# Patient Record
Sex: MALE | Race: OTHER | HISPANIC OR LATINO
[De-identification: names, ages, dates, MRNs, and addresses within clinical notes are randomized per-mention and may not be internally consistent; named-entity substitution may affect disease eponyms.]

---

## 2017-12-02 ENCOUNTER — RX RENEWAL (OUTPATIENT)
Age: 72
End: 2017-12-02

## 2018-03-11 ENCOUNTER — RX RENEWAL (OUTPATIENT)
Age: 73
End: 2018-03-11

## 2018-03-21 ENCOUNTER — APPOINTMENT (OUTPATIENT)
Dept: INTERNAL MEDICINE | Facility: CLINIC | Age: 73
End: 2018-03-21

## 2018-03-21 DIAGNOSIS — L21.0 SEBORRHEA CAPITIS: ICD-10-CM

## 2018-03-23 PROBLEM — L21.0 DANDRUFF: Status: ACTIVE | Noted: 2018-03-23

## 2018-05-07 ENCOUNTER — RX RENEWAL (OUTPATIENT)
Age: 73
End: 2018-05-07

## 2018-07-13 ENCOUNTER — APPOINTMENT (OUTPATIENT)
Dept: INTERNAL MEDICINE | Facility: CLINIC | Age: 73
End: 2018-07-13
Payer: MEDICARE

## 2018-07-13 PROCEDURE — 99214 OFFICE O/P EST MOD 30 MIN: CPT

## 2018-09-20 ENCOUNTER — MEDICATION RENEWAL (OUTPATIENT)
Age: 73
End: 2018-09-20

## 2018-10-08 ENCOUNTER — NON-APPOINTMENT (OUTPATIENT)
Age: 73
End: 2018-10-08

## 2018-10-08 ENCOUNTER — APPOINTMENT (OUTPATIENT)
Dept: INTERNAL MEDICINE | Facility: CLINIC | Age: 73
End: 2018-10-08
Payer: MEDICARE

## 2018-10-08 VITALS
WEIGHT: 174 LBS | HEIGHT: 65 IN | TEMPERATURE: 98.9 F | SYSTOLIC BLOOD PRESSURE: 145 MMHG | DIASTOLIC BLOOD PRESSURE: 80 MMHG | HEART RATE: 60 BPM | BODY MASS INDEX: 28.99 KG/M2

## 2018-10-08 PROCEDURE — 90686 IIV4 VACC NO PRSV 0.5 ML IM: CPT

## 2018-10-08 PROCEDURE — 93000 ELECTROCARDIOGRAM COMPLETE: CPT

## 2018-10-08 PROCEDURE — G0439: CPT

## 2018-10-08 PROCEDURE — G0009: CPT

## 2018-10-08 PROCEDURE — 90732 PPSV23 VACC 2 YRS+ SUBQ/IM: CPT

## 2018-10-08 PROCEDURE — G0008: CPT

## 2018-10-08 NOTE — ASSESSMENT
[FreeTextEntry1] : CPE OF 73 Y OLD MALE WITH PMX  AND DYSLIPIDEMIA= CONTINUE SAME MEDS AND DIET AND EXERCISE\par \par SEBORRHEIC DERMATITIS= CLOBETASOL AND KETOCONAZOLE TOPICAL RX\par INFLUENZA VACCINE AND PNEUMOVAX ORDERED\par RTO  3M \par EKG NEXT VISIT

## 2018-10-08 NOTE — PHYSICAL EXAM

## 2018-10-08 NOTE — REVIEW OF SYSTEMS
[Joint Pain] : joint pain [Back Pain] : back pain [Itching] : itching [Skin Rash] : skin rash [Negative] : Heme/Lymph [FreeTextEntry9] : LEFT HIP PAIN

## 2018-10-08 NOTE — HISTORY OF PRESENT ILLNESS
[de-identified] : PT COMES FOR CPE\par DEVELOPED VIRAL GASTROENTERITIS SX  X1 DAY LAST WEEK AND WORSENING OF SCALP DERMATITIS

## 2019-01-14 ENCOUNTER — APPOINTMENT (OUTPATIENT)
Dept: INTERNAL MEDICINE | Facility: CLINIC | Age: 74
End: 2019-01-14
Payer: MEDICARE

## 2019-01-14 VITALS
HEIGHT: 65 IN | BODY MASS INDEX: 29.16 KG/M2 | RESPIRATION RATE: 12 BRPM | DIASTOLIC BLOOD PRESSURE: 80 MMHG | WEIGHT: 175 LBS | SYSTOLIC BLOOD PRESSURE: 165 MMHG | HEART RATE: 71 BPM | OXYGEN SATURATION: 94 % | TEMPERATURE: 97.9 F

## 2019-01-14 PROCEDURE — 99214 OFFICE O/P EST MOD 30 MIN: CPT

## 2019-01-14 NOTE — PHYSICAL EXAM

## 2019-01-14 NOTE — HISTORY OF PRESENT ILLNESS
[de-identified] : PT COMES FOR F/U DM,HTN AND DYSLIPIDEMIA BUT ALSO WITH CC OF POST URI COUGH FOR OVER 1 WEEK WITH PURULENT SPUTUM LAST 2-3 DAYS ,NO FEVER ,NASAL CONGESTION AND D/C AS WELL

## 2019-01-14 NOTE — ASSESSMENT
[FreeTextEntry1] : 73 Y OLD MALE WITH PMX OF HTN ,DM AND DYSLIPIDEMIA= LABS ORDERED\par POST URI COUGH WITH PURULENT SPUTUM= ? EARLY SINUSITIS AND BRONCHITIS= EMPIRIC AUGMENTIN 875 BID FOR 7 DAYS

## 2019-01-15 LAB
ALBUMIN SERPL ELPH-MCNC: 4.6 G/DL
ALP BLD-CCNC: 41 U/L
ALT SERPL-CCNC: 29 U/L
ANION GAP SERPL CALC-SCNC: 12 MMOL/L
AST SERPL-CCNC: 28 U/L
BILIRUB SERPL-MCNC: 0.3 MG/DL
BUN SERPL-MCNC: 18 MG/DL
CALCIUM SERPL-MCNC: 10.4 MG/DL
CHLORIDE SERPL-SCNC: 102 MMOL/L
CHOLEST SERPL-MCNC: 233 MG/DL
CHOLEST/HDLC SERPL: 5.7 RATIO
CO2 SERPL-SCNC: 30 MMOL/L
CREAT SERPL-MCNC: 1.17 MG/DL
GLUCOSE SERPL-MCNC: 138 MG/DL
HBA1C MFR BLD HPLC: 6.9 %
HDLC SERPL-MCNC: 41 MG/DL
LDLC SERPL CALC-MCNC: 132 MG/DL
POTASSIUM SERPL-SCNC: 4.5 MMOL/L
PROT SERPL-MCNC: 7.2 G/DL
SODIUM SERPL-SCNC: 143 MMOL/L
TRIGL SERPL-MCNC: 302 MG/DL

## 2019-01-22 ENCOUNTER — RX RENEWAL (OUTPATIENT)
Age: 74
End: 2019-01-22

## 2019-04-26 ENCOUNTER — RX RENEWAL (OUTPATIENT)
Age: 74
End: 2019-04-26

## 2019-06-12 ENCOUNTER — APPOINTMENT (OUTPATIENT)
Dept: INTERNAL MEDICINE | Facility: CLINIC | Age: 74
End: 2019-06-12
Payer: MEDICARE

## 2019-06-12 VITALS
WEIGHT: 174 LBS | SYSTOLIC BLOOD PRESSURE: 159 MMHG | RESPIRATION RATE: 14 BRPM | TEMPERATURE: 98.7 F | BODY MASS INDEX: 28.99 KG/M2 | HEIGHT: 65 IN | HEART RATE: 75 BPM | OXYGEN SATURATION: 95 % | DIASTOLIC BLOOD PRESSURE: 76 MMHG

## 2019-06-12 VITALS — SYSTOLIC BLOOD PRESSURE: 150 MMHG | DIASTOLIC BLOOD PRESSURE: 70 MMHG

## 2019-06-12 DIAGNOSIS — M16.10 UNILATERAL PRIMARY OSTEOARTHRITIS, UNSPECIFIED HIP: ICD-10-CM

## 2019-06-12 PROCEDURE — 99214 OFFICE O/P EST MOD 30 MIN: CPT

## 2019-06-12 RX ORDER — AMOXICILLIN AND CLAVULANATE POTASSIUM 875; 125 MG/1; MG/1
875-125 TABLET, COATED ORAL
Qty: 14 | Refills: 0 | Status: DISCONTINUED | COMMUNITY
Start: 2019-01-14 | End: 2019-06-12

## 2019-06-12 NOTE — HISTORY OF PRESENT ILLNESS
[de-identified] : PT COMES FOR F/U DM,HTN AND DYSLIPIDEMIA\par REMAINS SEDENATRY ,WORSENING HIP PAin\par going to [facility] in 1 m for 2 m

## 2019-06-12 NOTE — ASSESSMENT
[FreeTextEntry1] : F/U VISIT OF 74 Y OLD MALE WITH PMX OF DM= STABLE HBA1C 6.6 ON METFORMIN\par DYSLIPIDEMIA= CONTINUE ZOCOR 40 MG DAILY AND FENOFIBRATE\par HTN = STABLE ON LOSARTAN/HCTZ\par RTO IN 3M\par HIP OA= WALKING RECOM

## 2019-06-12 NOTE — PHYSICAL EXAM
[No Acute Distress] : no acute distress [Well Developed] : well developed [Well Nourished] : well nourished [Normal Sclera/Conjunctiva] : normal sclera/conjunctiva [Well-Appearing] : well-appearing [PERRL] : pupils equal round and reactive to light [EOMI] : extraocular movements intact [Normal Outer Ear/Nose] : the outer ears and nose were normal in appearance [Normal Oropharynx] : the oropharynx was normal [No JVD] : no jugular venous distention [Supple] : supple [No Lymphadenopathy] : no lymphadenopathy [No Respiratory Distress] : no respiratory distress  [Thyroid Normal, No Nodules] : the thyroid was normal and there were no nodules present [No Accessory Muscle Use] : no accessory muscle use [Clear to Auscultation] : lungs were clear to auscultation bilaterally [Regular Rhythm] : with a regular rhythm [Normal Rate] : normal rate  [Normal S1, S2] : normal S1 and S2 [No Murmur] : no murmur heard [No Carotid Bruits] : no carotid bruits [No Abdominal Bruit] : a ~M bruit was not heard ~T in the abdomen [No Edema] : there was no peripheral edema [Pedal Pulses Present] : the pedal pulses are present [No Varicosities] : no varicosities [No Extremity Clubbing/Cyanosis] : no extremity clubbing/cyanosis [No Palpable Aorta] : no palpable aorta [Non Tender] : non-tender [Soft] : abdomen soft [No Masses] : no abdominal mass palpated [Non-distended] : non-distended [No HSM] : no HSM [Normal Bowel Sounds] : normal bowel sounds [Normal Posterior Cervical Nodes] : no posterior cervical lymphadenopathy [Normal Anterior Cervical Nodes] : no anterior cervical lymphadenopathy [No CVA Tenderness] : no CVA  tenderness [Grossly Normal Strength/Tone] : grossly normal strength/tone [No Joint Swelling] : no joint swelling [No Spinal Tenderness] : no spinal tenderness [No Rash] : no rash [Normal Gait] : normal gait [Coordination Grossly Intact] : coordination grossly intact [No Focal Deficits] : no focal deficits [Normal Affect] : the affect was normal [Deep Tendon Reflexes (DTR)] : deep tendon reflexes were 2+ and symmetric [de-identified] : OBESE [Normal Insight/Judgement] : insight and judgment were intact

## 2019-11-20 ENCOUNTER — APPOINTMENT (OUTPATIENT)
Dept: INTERNAL MEDICINE | Facility: CLINIC | Age: 74
End: 2019-11-20

## 2019-11-27 ENCOUNTER — APPOINTMENT (OUTPATIENT)
Dept: INTERNAL MEDICINE | Facility: CLINIC | Age: 74
End: 2019-11-27
Payer: MEDICARE

## 2019-11-27 ENCOUNTER — NON-APPOINTMENT (OUTPATIENT)
Age: 74
End: 2019-11-27

## 2019-11-27 VITALS
BODY MASS INDEX: 26.07 KG/M2 | DIASTOLIC BLOOD PRESSURE: 80 MMHG | SYSTOLIC BLOOD PRESSURE: 150 MMHG | OXYGEN SATURATION: 95 % | RESPIRATION RATE: 12 BRPM | HEART RATE: 74 BPM | WEIGHT: 172 LBS | HEIGHT: 68 IN | TEMPERATURE: 97.9 F

## 2019-11-27 LAB
ALBUMIN SERPL ELPH-MCNC: 4.6 G/DL
ALP BLD-CCNC: 33 U/L
ALT SERPL-CCNC: 42 U/L
ANION GAP SERPL CALC-SCNC: 13 MMOL/L
AST SERPL-CCNC: 25 U/L
BILIRUB SERPL-MCNC: 0.5 MG/DL
BUN SERPL-MCNC: 25 MG/DL
CALCIUM SERPL-MCNC: 9.9 MG/DL
CHLORIDE SERPL-SCNC: 103 MMOL/L
CHOLEST SERPL-MCNC: 244 MG/DL
CHOLEST/HDLC SERPL: 5.8 RATIO
CO2 SERPL-SCNC: 28 MMOL/L
CREAT SERPL-MCNC: 1.06 MG/DL
ESTIMATED AVERAGE GLUCOSE: 143 MG/DL
GLUCOSE SERPL-MCNC: 180 MG/DL
HBA1C MFR BLD HPLC: 6.6 %
HDLC SERPL-MCNC: 42 MG/DL
LDLC SERPL CALC-MCNC: 136 MG/DL
POTASSIUM SERPL-SCNC: 5.3 MMOL/L
PROT SERPL-MCNC: 7 G/DL
SODIUM SERPL-SCNC: 144 MMOL/L
TRIGL SERPL-MCNC: 331 MG/DL

## 2019-11-27 PROCEDURE — G0008: CPT

## 2019-11-27 PROCEDURE — G0439: CPT

## 2019-11-27 PROCEDURE — 93000 ELECTROCARDIOGRAM COMPLETE: CPT

## 2019-11-27 PROCEDURE — 90662 IIV NO PRSV INCREASED AG IM: CPT

## 2019-11-27 RX ORDER — METFORMIN HYDROCHLORIDE 500 MG/1
500 TABLET, EXTENDED RELEASE ORAL
Qty: 180 | Refills: 3 | Status: DISCONTINUED | COMMUNITY
Start: 2017-03-13 | End: 2019-11-27

## 2019-11-27 NOTE — ASSESSMENT
[FreeTextEntry1] : CPE OF 74 Y OLD MALE WITH PMX OF HTN = INCREASE LOSARTAN /12.5 FROM 50-12.5\par DM = STABLE \par DYSLIPIDEMIA= LOW FAT LOW ALCOHOL CONSUMPTION \par RTO IN 4 M\par F/U OPHTH,GI AND POD REFERRAL PROVIDED\par INFLUENZA VACCINE TODAY

## 2020-01-10 ENCOUNTER — APPOINTMENT (OUTPATIENT)
Dept: INTERNAL MEDICINE | Facility: CLINIC | Age: 75
End: 2020-01-10
Payer: MEDICARE

## 2020-01-10 VITALS
TEMPERATURE: 98.9 F | BODY MASS INDEX: 28.66 KG/M2 | DIASTOLIC BLOOD PRESSURE: 71 MMHG | HEIGHT: 65 IN | SYSTOLIC BLOOD PRESSURE: 141 MMHG | HEART RATE: 65 BPM | RESPIRATION RATE: 14 BRPM | WEIGHT: 172 LBS | OXYGEN SATURATION: 96 %

## 2020-01-10 PROCEDURE — 99214 OFFICE O/P EST MOD 30 MIN: CPT

## 2020-01-10 NOTE — ASSESSMENT
[FreeTextEntry1] : 74 Y OLD MALE WITH ATYP CHEST PAIN= CXR ORDERED AND LEFT RIB CAGE XR\par LIN AND EASY FATIGUE= TO SEE CARDIOLOGY

## 2020-01-10 NOTE — PHYSICAL EXAM
[Well Nourished] : well nourished [Well Developed] : well developed [No Acute Distress] : no acute distress [Well-Appearing] : well-appearing [Normal Sclera/Conjunctiva] : normal sclera/conjunctiva [EOMI] : extraocular movements intact [PERRL] : pupils equal round and reactive to light [Normal Oropharynx] : the oropharynx was normal [Normal Outer Ear/Nose] : the outer ears and nose were normal in appearance [No JVD] : no jugular venous distention [Supple] : supple [No Lymphadenopathy] : no lymphadenopathy [No Respiratory Distress] : no respiratory distress  [No Accessory Muscle Use] : no accessory muscle use [Thyroid Normal, No Nodules] : the thyroid was normal and there were no nodules present [Normal Rate] : normal rate  [Regular Rhythm] : with a regular rhythm [Clear to Auscultation] : lungs were clear to auscultation bilaterally [Normal S1, S2] : normal S1 and S2 [No Carotid Bruits] : no carotid bruits [No Murmur] : no murmur heard [No Abdominal Bruit] : a ~M bruit was not heard ~T in the abdomen [No Varicosities] : no varicosities [Pedal Pulses Present] : the pedal pulses are present [No Palpable Aorta] : no palpable aorta [No Edema] : there was no peripheral edema [No Extremity Clubbing/Cyanosis] : no extremity clubbing/cyanosis [Soft] : abdomen soft [Non Tender] : non-tender [Normal Bowel Sounds] : normal bowel sounds [Non-distended] : non-distended [No Masses] : no abdominal mass palpated [No HSM] : no HSM [No CVA Tenderness] : no CVA  tenderness [Normal Posterior Cervical Nodes] : no posterior cervical lymphadenopathy [Normal Anterior Cervical Nodes] : no anterior cervical lymphadenopathy [No Rash] : no rash [No Spinal Tenderness] : no spinal tenderness [No Joint Swelling] : no joint swelling [Grossly Normal Strength/Tone] : grossly normal strength/tone [Normal Gait] : normal gait [No Focal Deficits] : no focal deficits [Coordination Grossly Intact] : coordination grossly intact [Deep Tendon Reflexes (DTR)] : deep tendon reflexes were 2+ and symmetric [Normal Affect] : the affect was normal [Normal Insight/Judgement] : insight and judgment were intact [de-identified] : MINIMAL TEDNERNESS LEFT RIB CAGE MEDIAL AXILLARY REGION 5TH RIB

## 2020-01-10 NOTE — HISTORY OF PRESENT ILLNESS
[FreeTextEntry8] : CC OF 2 M OF VAGUE MILD LEFT RIB CGE PAIN ,AXILLARY REGION AFTER SUDDEN INTENSE PAIN IN SAME LOCATION FOR FEW SECONDS THEN\par NO OTHER ASSOCIATED SX\par WAS ON VACATION FEW WEEKS AGO AND NOTICED THAT HE DEVELOPED SOB FASTER OR SOONER THAT HE USED TO DEVELOPED 1 Y AGO

## 2020-01-15 ENCOUNTER — APPOINTMENT (OUTPATIENT)
Dept: INTERNAL MEDICINE | Facility: CLINIC | Age: 75
End: 2020-01-15
Payer: MEDICARE

## 2020-01-15 VITALS
HEIGHT: 65 IN | DIASTOLIC BLOOD PRESSURE: 76 MMHG | WEIGHT: 169 LBS | OXYGEN SATURATION: 96 % | SYSTOLIC BLOOD PRESSURE: 166 MMHG | HEART RATE: 68 BPM | BODY MASS INDEX: 28.16 KG/M2 | TEMPERATURE: 98.8 F | RESPIRATION RATE: 12 BRPM

## 2020-01-15 DIAGNOSIS — R06.2 WHEEZING: ICD-10-CM

## 2020-01-15 DIAGNOSIS — R06.09 OTHER FORMS OF DYSPNEA: ICD-10-CM

## 2020-01-15 DIAGNOSIS — J06.9 ACUTE UPPER RESPIRATORY INFECTION, UNSPECIFIED: ICD-10-CM

## 2020-01-15 PROCEDURE — 99214 OFFICE O/P EST MOD 30 MIN: CPT

## 2020-01-15 NOTE — REVIEW OF SYSTEMS
[Fever] : fever [Chills] : chills [Fatigue] : fatigue [Night Sweats] : night sweats [Sore Throat] : sore throat [Cough] : cough [Wheezing] : wheezing [Shortness Of Breath] : shortness of breath [Dyspnea on Exertion] : dyspnea on exertion [Negative] : Heme/Lymph [FreeTextEntry5] : SEE HPI

## 2020-01-15 NOTE — PHYSICAL EXAM
[Normal Rhythm/Effort] : normal respiratory rhythm and effort [Dry Cough] : a dry cough was heard [Scattered Wheezes] : scattered wheezing was heard [Decreased Breath Sounds] : breath sounds were decreased diffusely [Normal to Percussion] : the lungs were normal to percussion [Normal Rate] : normal rate  [Normal] : palpation of the chest was normal [No Edema] : there was no peripheral edema [Normal S1, S2] : normal S1 and S2 [Regular Rhythm] : with a regular rhythm

## 2020-01-15 NOTE — HISTORY OF PRESENT ILLNESS
[FreeTextEntry8] : PT COMES WITH CC OF COUGH ,SOB ,MILD WHEEZING CHILLS AND FEVER FOR LAST 4 DAYS ,LAST 24 HS FEVER AND BODY ACHES RESOLVED AND COUGH IS HANK INTENSE\par ALSO AS PER WIFE LIN HAS BEEN HAPPENING FOR LAST FEW MONTHS

## 2020-01-15 NOTE — ASSESSMENT
[FreeTextEntry1] : 74 Y OLD MALE PRESENTS WITH ACUTE VIRAL URI WITH SOME WHEEZING = TESSALON PEARLS TID AND PROVENTIL INH RX\par LIN FOR MONTHS ,FORMER SMOKER 30 Y AGO ,? COPD ON CXR = REFER TO SEE CARDIOLOGY \par RTO  PRN

## 2020-01-21 ENCOUNTER — APPOINTMENT (OUTPATIENT)
Dept: HEART AND VASCULAR | Facility: CLINIC | Age: 75
End: 2020-01-21
Payer: MEDICARE

## 2020-01-21 ENCOUNTER — NON-APPOINTMENT (OUTPATIENT)
Age: 75
End: 2020-01-21

## 2020-01-21 VITALS
DIASTOLIC BLOOD PRESSURE: 76 MMHG | BODY MASS INDEX: 27.82 KG/M2 | SYSTOLIC BLOOD PRESSURE: 146 MMHG | WEIGHT: 167 LBS | OXYGEN SATURATION: 95 % | HEIGHT: 65 IN | RESPIRATION RATE: 14 BRPM | TEMPERATURE: 98.1 F | HEART RATE: 67 BPM

## 2020-01-21 PROCEDURE — 99205 OFFICE O/P NEW HI 60 MIN: CPT

## 2020-01-21 NOTE — PHYSICAL EXAM
[General Appearance - Well Developed] : well developed [Normal Appearance] : normal appearance [Well Groomed] : well groomed [General Appearance - Well Nourished] : well nourished [General Appearance - In No Acute Distress] : no acute distress [No Deformities] : no deformities [Heart Sounds] : normal S1 and S2 [Heart Rate And Rhythm] : heart rate and rhythm were normal [Murmurs] : no murmurs present [Respiration, Rhythm And Depth] : normal respiratory rhythm and effort [Exaggerated Use Of Accessory Muscles For Inspiration] : no accessory muscle use [Auscultation Breath Sounds / Voice Sounds] : lungs were clear to auscultation bilaterally [Abdomen Soft] : soft [Abdomen Tenderness] : non-tender [Abdomen Mass (___ Cm)] : no abdominal mass palpated [Abnormal Walk] : normal gait [Gait - Sufficient For Exercise Testing] : the gait was sufficient for exercise testing [Cyanosis, Localized] : no localized cyanosis [Nail Clubbing] : no clubbing of the fingernails [Petechial Hemorrhages (___cm)] : no petechial hemorrhages [] : no ischemic changes

## 2020-01-23 ENCOUNTER — APPOINTMENT (OUTPATIENT)
Dept: HEART AND VASCULAR | Facility: CLINIC | Age: 75
End: 2020-01-23
Payer: MEDICARE

## 2020-01-23 PROCEDURE — 93015 CV STRESS TEST SUPVJ I&R: CPT

## 2020-01-23 PROCEDURE — 93306 TTE W/DOPPLER COMPLETE: CPT

## 2020-01-23 NOTE — ASSESSMENT
[FreeTextEntry1] : 75 yo M with DM, HLD, here for first evaluation of lin and chest pain. \par \par CHEST PAIN\par -atypical but in the setting of risk factors will order a stress test to r/o CAD\par -echo \par -cont with BP control \par \par LIN\par -echo as above to r/o any WMA and valvular pathology \par \par HTN\par -uncontrolled\par -increase losartan-hctz to losartan-hctz 100-25 mg daily \par -cont with metoprolol 100 mg daily \par -CMP\par -echo\par \par HLD\par -fasting lipid panel\par \par f/u in 3 weeks for echo, stress test, labs and BP check

## 2020-01-23 NOTE — HISTORY OF PRESENT ILLNESS
[FreeTextEntry1] : 73 yo M with DM, HLD, here for first evaluation of lin and chest pain. \par Has not seen a cardiologist in 15 years. \par Pain is continuous, on left rib cage and sub axillary The patient is reporting a 3/10 continuos not better defined chest pain for 6 weeks radiating to neck and left arm. \par Unlimited exercise tolerance (can walk up to 20 minutes) limited by  LIN \par \par PMH \par as above\par \par ALL\par NKDA\par \par MEDS\par metorpolol 100 mg daily \par losartan 1100-12.5 mg daily \par simvsatin 40 mg daily \par \par FH\par  at age 57 of [place]\par \par SH\par no smoke, 1 bottle of wine/day, occasional wiky, no drugs

## 2020-01-24 LAB
ALBUMIN SERPL ELPH-MCNC: 5.2 G/DL
ALP BLD-CCNC: 38 U/L
ALT SERPL-CCNC: 40 U/L
ANION GAP SERPL CALC-SCNC: 27 MMOL/L
AST SERPL-CCNC: 35 U/L
BILIRUB SERPL-MCNC: 0.4 MG/DL
BUN SERPL-MCNC: 21 MG/DL
CALCIUM SERPL-MCNC: 10.5 MG/DL
CHLORIDE SERPL-SCNC: 104 MMOL/L
CHOLEST SERPL-MCNC: 197 MG/DL
CHOLEST/HDLC SERPL: 4.9 RATIO
CO2 SERPL-SCNC: 16 MMOL/L
CREAT SERPL-MCNC: 1.12 MG/DL
GLUCOSE SERPL-MCNC: 144 MG/DL
HDLC SERPL-MCNC: 41 MG/DL
LDLC SERPL CALC-MCNC: 103 MG/DL
POTASSIUM SERPL-SCNC: 5.1 MMOL/L
PROT SERPL-MCNC: 8 G/DL
SODIUM SERPL-SCNC: 146 MMOL/L
TRIGL SERPL-MCNC: 271 MG/DL

## 2020-02-04 ENCOUNTER — APPOINTMENT (OUTPATIENT)
Dept: HEART AND VASCULAR | Facility: CLINIC | Age: 75
End: 2020-02-04
Payer: MEDICARE

## 2020-02-04 VITALS
TEMPERATURE: 98.1 F | BODY MASS INDEX: 27.82 KG/M2 | HEIGHT: 65 IN | HEART RATE: 66 BPM | SYSTOLIC BLOOD PRESSURE: 137 MMHG | RESPIRATION RATE: 14 BRPM | DIASTOLIC BLOOD PRESSURE: 62 MMHG | OXYGEN SATURATION: 96 % | WEIGHT: 167 LBS

## 2020-02-04 PROCEDURE — 99215 OFFICE O/P EST HI 40 MIN: CPT

## 2020-02-04 NOTE — PHYSICAL EXAM
[General Appearance - Well Developed] : well developed [Normal Appearance] : normal appearance [No Deformities] : no deformities [Well Groomed] : well groomed [General Appearance - Well Nourished] : well nourished [General Appearance - In No Acute Distress] : no acute distress [Heart Rate And Rhythm] : heart rate and rhythm were normal [Heart Sounds] : normal S1 and S2 [Murmurs] : no murmurs present [Respiration, Rhythm And Depth] : normal respiratory rhythm and effort [Exaggerated Use Of Accessory Muscles For Inspiration] : no accessory muscle use [Auscultation Breath Sounds / Voice Sounds] : lungs were clear to auscultation bilaterally [Abdomen Soft] : soft [Abdomen Tenderness] : non-tender [Abdomen Mass (___ Cm)] : no abdominal mass palpated [Abnormal Walk] : normal gait [Gait - Sufficient For Exercise Testing] : the gait was sufficient for exercise testing [Nail Clubbing] : no clubbing of the fingernails [Cyanosis, Localized] : no localized cyanosis [Petechial Hemorrhages (___cm)] : no petechial hemorrhages [] : no ischemic changes

## 2020-02-04 NOTE — ASSESSMENT
[FreeTextEntry1] : 73 yo M with DM, HLD, here for followup. \par \par CHEST PAIN\par -Resolved\par -negative exercise stress test \par -echo wnl\par -cont with BP control \par \par LIN \par -resolved \par \par HTN\par -Well-controlled today\par -Patient had increased losartan-hctz as recommended on last visit (still taking 100-12.5) \par -Recommended to continue with current medication regimen and call the clinic if blood pressure is > 140/90\par -cont with metoprolol 100 mg daily \par -repeat CMP in 3 month for monitoring K (5.1 today)\par -echo wnl\par \par HLD\par -fasting lipid panel reviewed \par -Hypertriglyceridemia noted,(downtrending)  the patient wants to continue with healthy diet and exercise prior to start a medications\par -Recommended healthy diet and will repeat lipid panel in 4 months\par \par f/u in 3 months or sooner if any symptoms

## 2020-02-04 NOTE — HISTORY OF PRESENT ILLNESS
[FreeTextEntry1] : 73 yo M with DM, HLD, here for follow up and obtain stress test results \par  The patient reports complete resolution of his chest pain with resolution of his cough (in setting of URI). Today is accompanied by his wife. \par He has a limited exercise tolerance and feels overall fine. \par Denies chest pain, sob, palpitations, syncope, presyncope. \par Reports that he did not change his medications as prescribed on last visit as he reports that his blood pressure has been well controlled at home. \par \par \par PMH \par as above\par \par ALL\par NKDA\par \par MEDS\par metorpolol 100 mg daily \par losartan 100-12.5 mg daily \par simvsatin 40 mg daily \par \par FH\par  at age 57 of [place]\par \par SH\par no smoke, 1 bottle of wine/day, occasional wiky, no drugs\par \par EKG 2020\par SR, wnl\par \par Echocardiogram 2020\par Left ventricular ejection fraction is 60-65%\par Mild MR\par \par Exercise stress test \par The patient exercised for 7 minutes 58 seconds on a Neville protocol (94% of MPHR)\par EKG with 2 mm ST depression upsloping in V5 and V6 at peak exercise\par The study was stopped due to fatigue, No chest pain no arrhythmias during exercise or recovery\par \par LABS\par 2020\par creat 1.1\par K 5.1 \par \par Lipid profile 2020\par Triglycerides 271\par Cholesterol 197\par HDL 41\par \par \par \par \par

## 2020-03-23 ENCOUNTER — RX RENEWAL (OUTPATIENT)
Age: 75
End: 2020-03-23

## 2020-05-05 LAB
25(OH)D3 SERPL-MCNC: 26.1 NG/ML
ALBUMIN SERPL ELPH-MCNC: 4.7 G/DL
ALP BLD-CCNC: 37 U/L
ALT SERPL-CCNC: 34 U/L
ANION GAP SERPL CALC-SCNC: 14 MMOL/L
AST SERPL-CCNC: 23 U/L
BASOPHILS # BLD AUTO: 0.1 K/UL
BASOPHILS NFR BLD AUTO: 1.5 %
BILIRUB SERPL-MCNC: 0.4 MG/DL
BUN SERPL-MCNC: 29 MG/DL
CALCIUM SERPL-MCNC: 10 MG/DL
CHLORIDE SERPL-SCNC: 101 MMOL/L
CHOLEST SERPL-MCNC: 207 MG/DL
CHOLEST/HDLC SERPL: 6.2 RATIO
CO2 SERPL-SCNC: 27 MMOL/L
CREAT SERPL-MCNC: 1.24 MG/DL
EOSINOPHIL # BLD AUTO: 0.96 K/UL
EOSINOPHIL NFR BLD AUTO: 14 %
ESTIMATED AVERAGE GLUCOSE: 137 MG/DL
GLUCOSE SERPL-MCNC: 154 MG/DL
HBA1C MFR BLD HPLC: 6.4 %
HCT VFR BLD CALC: 43.7 %
HDLC SERPL-MCNC: 34 MG/DL
HGB BLD-MCNC: 14 G/DL
IMM GRANULOCYTES NFR BLD AUTO: 0.6 %
LDLC SERPL CALC-MCNC: 97 MG/DL
LYMPHOCYTES # BLD AUTO: 1.89 K/UL
LYMPHOCYTES NFR BLD AUTO: 27.6 %
MAN DIFF?: NORMAL
MCHC RBC-ENTMCNC: 31 PG
MCHC RBC-ENTMCNC: 32 GM/DL
MCV RBC AUTO: 96.7 FL
MONOCYTES # BLD AUTO: 0.65 K/UL
MONOCYTES NFR BLD AUTO: 9.5 %
NEUTROPHILS # BLD AUTO: 3.21 K/UL
NEUTROPHILS NFR BLD AUTO: 46.8 %
PLATELET # BLD AUTO: 302 K/UL
POTASSIUM SERPL-SCNC: 4.9 MMOL/L
PROT SERPL-MCNC: 6.9 G/DL
PSA FREE FLD-MCNC: 35 %
PSA FREE SERPL-MCNC: 0.15 NG/ML
PSA SERPL-MCNC: 0.45 NG/ML
RBC # BLD: 4.52 M/UL
RBC # FLD: 12.8 %
SODIUM SERPL-SCNC: 143 MMOL/L
TRIGL SERPL-MCNC: 384 MG/DL
TSH SERPL-ACNC: 1.96 UIU/ML
VIT B12 SERPL-MCNC: 563 PG/ML
WBC # FLD AUTO: 6.85 K/UL

## 2020-05-05 RX ORDER — CLOBETASOL PROPIONATE 0.5 MG/ML
0.05 SOLUTION TOPICAL DAILY
Qty: 1 | Refills: 0 | Status: COMPLETED | COMMUNITY
Start: 2018-10-08 | End: 2020-05-05

## 2020-05-05 RX ORDER — KETOCONAZOLE 20.5 MG/ML
2 SHAMPOO, SUSPENSION TOPICAL
Qty: 1 | Refills: 1 | Status: COMPLETED | COMMUNITY
Start: 2018-03-23 | End: 2020-05-05

## 2020-05-05 RX ORDER — BENZONATATE 200 MG/1
200 CAPSULE ORAL
Qty: 15 | Refills: 0 | Status: COMPLETED | COMMUNITY
Start: 2020-01-15 | End: 2020-05-05

## 2020-05-08 ENCOUNTER — APPOINTMENT (OUTPATIENT)
Dept: INTERNAL MEDICINE | Facility: CLINIC | Age: 75
End: 2020-05-08
Payer: MEDICARE

## 2020-05-08 VITALS
TEMPERATURE: 98.3 F | DIASTOLIC BLOOD PRESSURE: 78 MMHG | OXYGEN SATURATION: 96 % | SYSTOLIC BLOOD PRESSURE: 158 MMHG | WEIGHT: 171 LBS | BODY MASS INDEX: 28.49 KG/M2 | RESPIRATION RATE: 12 BRPM | HEIGHT: 65 IN | HEART RATE: 65 BPM

## 2020-05-08 DIAGNOSIS — R68.89 OTHER GENERAL SYMPTOMS AND SIGNS: ICD-10-CM

## 2020-05-08 PROCEDURE — 99214 OFFICE O/P EST MOD 30 MIN: CPT

## 2020-05-08 NOTE — PHYSICAL EXAM
[No Acute Distress] : no acute distress [Well Nourished] : well nourished [Well Developed] : well developed [Well-Appearing] : well-appearing [Normal Sclera/Conjunctiva] : normal sclera/conjunctiva [PERRL] : pupils equal round and reactive to light [EOMI] : extraocular movements intact [Normal Outer Ear/Nose] : the outer ears and nose were normal in appearance [No JVD] : no jugular venous distention [No Lymphadenopathy] : no lymphadenopathy [Thyroid Normal, No Nodules] : the thyroid was normal and there were no nodules present [Supple] : supple [No Accessory Muscle Use] : no accessory muscle use [No Respiratory Distress] : no respiratory distress  [Clear to Auscultation] : lungs were clear to auscultation bilaterally [Normal Rate] : normal rate  [Normal S1, S2] : normal S1 and S2 [Regular Rhythm] : with a regular rhythm [No Murmur] : no murmur heard [No Carotid Bruits] : no carotid bruits [No Abdominal Bruit] : a ~M bruit was not heard ~T in the abdomen [No Varicosities] : no varicosities [No Edema] : there was no peripheral edema [Pedal Pulses Present] : the pedal pulses are present [No Extremity Clubbing/Cyanosis] : no extremity clubbing/cyanosis [No Palpable Aorta] : no palpable aorta [Soft] : abdomen soft [Non Tender] : non-tender [Non-distended] : non-distended [No Masses] : no abdominal mass palpated [No HSM] : no HSM [Normal Bowel Sounds] : normal bowel sounds [Normal Posterior Cervical Nodes] : no posterior cervical lymphadenopathy [Normal Anterior Cervical Nodes] : no anterior cervical lymphadenopathy [No CVA Tenderness] : no CVA  tenderness [No Spinal Tenderness] : no spinal tenderness [No Joint Swelling] : no joint swelling [Grossly Normal Strength/Tone] : grossly normal strength/tone [No Rash] : no rash [Coordination Grossly Intact] : coordination grossly intact [No Focal Deficits] : no focal deficits [Normal Gait] : normal gait [Deep Tendon Reflexes (DTR)] : deep tendon reflexes were 2+ and symmetric [Normal Affect] : the affect was normal [Normal Insight/Judgement] : insight and judgment were intact [de-identified] : NORMAL TONGUE

## 2020-05-08 NOTE — ASSESSMENT
[FreeTextEntry1] : F/U VISIT OF 75 Y OLD MALE WITH PMX OF DYSLIPIDEMIA = STRICT LOW CARB DIET AND DECREASE ALCOHOL CONSUMPTION RECOMM\par DM = STABLE\par SUSPECTED COVID- 19 2 M AGO= FOR TESTING IN 2 DAYS\par RTO IN 3-4 M AFTER LABS

## 2020-05-08 NOTE — HISTORY OF PRESENT ILLNESS
[de-identified] : PT HAD SEVERE URI SX 3/2020;LASTED FOR 2 WEEKS,LOST SENSE OF SMELL AND TASTE AND SOME WT \par HAS KOREY TO GET TESTED 5/10/20\par HAD  NOTICED WHITE TONGUE FOR 1 M ,NO SX

## 2020-06-09 ENCOUNTER — APPOINTMENT (OUTPATIENT)
Dept: HEART AND VASCULAR | Facility: CLINIC | Age: 75
End: 2020-06-09
Payer: MEDICARE

## 2020-06-09 VITALS
RESPIRATION RATE: 14 BRPM | DIASTOLIC BLOOD PRESSURE: 77 MMHG | WEIGHT: 167 LBS | HEIGHT: 65 IN | OXYGEN SATURATION: 96 % | TEMPERATURE: 98.1 F | HEART RATE: 55 BPM | BODY MASS INDEX: 27.82 KG/M2 | SYSTOLIC BLOOD PRESSURE: 147 MMHG

## 2020-06-09 PROCEDURE — 99214 OFFICE O/P EST MOD 30 MIN: CPT

## 2020-06-09 NOTE — ASSESSMENT
[FreeTextEntry1] : 75 yo M with DM, HLD, here for followup. \par \par CHEST PAIN\par -Resolved, he has good exercise tolerance \par -negative exercise stress test \par -echo wnl\par -cont with BP control \par \par LIN \par -resolved \par \par HTN\par -Well-controlled today\par cont with losartan-hctz  100-12.5 \par -Recommended to call the clinic if blood pressure is > 140/90\par -cont with metoprolol 100 mg daily \par -cmp wnl\par \par HLD\par -fasting lipid panel reviewed \par -agree with fenofibrate and statin as per PMD \par -Recommended healthy diet and will repeat lipid panel in 4 months\par \par f/u in 4 months or sooner if any symptoms

## 2020-06-09 NOTE — PHYSICAL EXAM
[General Appearance - Well Developed] : well developed [Normal Appearance] : normal appearance [Well Groomed] : well groomed [General Appearance - Well Nourished] : well nourished [No Deformities] : no deformities [General Appearance - In No Acute Distress] : no acute distress [Heart Rate And Rhythm] : heart rate and rhythm were normal [Heart Sounds] : normal S1 and S2 [Murmurs] : no murmurs present [Respiration, Rhythm And Depth] : normal respiratory rhythm and effort [Exaggerated Use Of Accessory Muscles For Inspiration] : no accessory muscle use [Abdomen Soft] : soft [Auscultation Breath Sounds / Voice Sounds] : lungs were clear to auscultation bilaterally [Abdomen Tenderness] : non-tender [Abdomen Mass (___ Cm)] : no abdominal mass palpated [Abnormal Walk] : normal gait [Gait - Sufficient For Exercise Testing] : the gait was sufficient for exercise testing [Nail Clubbing] : no clubbing of the fingernails [Cyanosis, Localized] : no localized cyanosis [Petechial Hemorrhages (___cm)] : no petechial hemorrhages [] : no ischemic changes

## 2020-06-09 NOTE — HISTORY OF PRESENT ILLNESS
[FreeTextEntry1] : 76 yo M with HTN DM, HLD, here for follow up \par  The patient reports feeling fine  Today is accompanied by his wife. \par Denies chest pain, sob, palpitations, syncope, presyncope. \par \par PMH \par as above\par \par ALL\par NKDA\par \par MEDS\par metoprolol 100 mg daily \par losartan 100-12.5 mg daily \par simvastatin 40 mg daily \par \par FH\par father  at age 57 of [place]\par \par SH\par no smoke, 1 bottle of wine/day, occasional wisky, no drugs\par \par EKG 2020\par SR, wnl\par \par Echocardiogram 2020\par Left ventricular ejection fraction is 60-65%\par Mild MR\par \par Exercise stress test \par The patient exercised for 7 minutes 58 seconds on a Neville protocol (94% of MPHR)\par EKG with 2 mm ST depression upsloping in V5 and V6 at peak exercise\par The study was stopped due to fatigue, No chest pain no arrhythmias during exercise or recovery\par \par LABS reviwed\par Lipid profile 2020 reviewed \par creatinine 1.24 \par \par \par \par

## 2020-09-25 ENCOUNTER — LABORATORY RESULT (OUTPATIENT)
Age: 75
End: 2020-09-25

## 2020-09-25 ENCOUNTER — APPOINTMENT (OUTPATIENT)
Dept: HEART AND VASCULAR | Facility: CLINIC | Age: 75
End: 2020-09-25
Payer: MEDICARE

## 2020-09-25 PROCEDURE — 36415 COLL VENOUS BLD VENIPUNCTURE: CPT

## 2020-10-05 ENCOUNTER — APPOINTMENT (OUTPATIENT)
Dept: INTERNAL MEDICINE | Facility: CLINIC | Age: 75
End: 2020-10-05
Payer: MEDICARE

## 2020-10-05 VITALS
HEIGHT: 65 IN | WEIGHT: 166 LBS | OXYGEN SATURATION: 96 % | DIASTOLIC BLOOD PRESSURE: 64 MMHG | TEMPERATURE: 98 F | RESPIRATION RATE: 15 BRPM | HEART RATE: 69 BPM | BODY MASS INDEX: 27.66 KG/M2 | SYSTOLIC BLOOD PRESSURE: 133 MMHG

## 2020-10-05 DIAGNOSIS — R07.89 OTHER CHEST PAIN: ICD-10-CM

## 2020-10-05 DIAGNOSIS — Z23 ENCOUNTER FOR IMMUNIZATION: ICD-10-CM

## 2020-10-05 PROCEDURE — 99214 OFFICE O/P EST MOD 30 MIN: CPT

## 2020-10-05 NOTE — HISTORY OF PRESENT ILLNESS
[de-identified] : PT COMES FOR F/U ,HAD LEFT SIDE CHEST WALL PAIN FOR ALMOST 2 WEEKS ,RESOLVED BY ITSELF ,NO COUGH ,NO FEVER NO SOB ,WALKING 1 HOUR A DAY SINCE HE RETIRED 5 MONTHS AGO

## 2020-10-05 NOTE — PHYSICAL EXAM
[No Acute Distress] : no acute distress [Well Nourished] : well nourished [Well Developed] : well developed [Well-Appearing] : well-appearing [Normal Sclera/Conjunctiva] : normal sclera/conjunctiva [PERRL] : pupils equal round and reactive to light [EOMI] : extraocular movements intact [No JVD] : no jugular venous distention [No Lymphadenopathy] : no lymphadenopathy [Supple] : supple [Thyroid Normal, No Nodules] : the thyroid was normal and there were no nodules present [No Respiratory Distress] : no respiratory distress  [No Accessory Muscle Use] : no accessory muscle use [Clear to Auscultation] : lungs were clear to auscultation bilaterally [Normal Rate] : normal rate  [Regular Rhythm] : with a regular rhythm [Normal S1, S2] : normal S1 and S2 [No Murmur] : no murmur heard [No Carotid Bruits] : no carotid bruits [No Abdominal Bruit] : a ~M bruit was not heard ~T in the abdomen [No Varicosities] : no varicosities [Pedal Pulses Present] : the pedal pulses are present [No Edema] : there was no peripheral edema [No Palpable Aorta] : no palpable aorta [No Extremity Clubbing/Cyanosis] : no extremity clubbing/cyanosis [Soft] : abdomen soft [Non Tender] : non-tender [Non-distended] : non-distended [No Masses] : no abdominal mass palpated [No HSM] : no HSM [Normal Bowel Sounds] : normal bowel sounds [Normal Anterior Cervical Nodes] : no anterior cervical lymphadenopathy [No CVA Tenderness] : no CVA  tenderness [No Spinal Tenderness] : no spinal tenderness [No Joint Swelling] : no joint swelling [Grossly Normal Strength/Tone] : grossly normal strength/tone [No Rash] : no rash [Normal Affect] : the affect was normal [Normal Insight/Judgement] : insight and judgment were intact

## 2020-10-05 NOTE — ASSESSMENT
[FreeTextEntry1] : 75 Y OLD MALE WITH PMX OF DM ,DYSLIPIDEMIA AND ATYPICAL CHEST PAIN= LABS REVIEWED;INFLUENZA VACCINE TODAY \par RTO IN 3 M

## 2020-12-15 ENCOUNTER — RX RENEWAL (OUTPATIENT)
Age: 75
End: 2020-12-15

## 2020-12-23 PROBLEM — J06.9 ACUTE URI: Status: RESOLVED | Noted: 2020-01-15 | Resolved: 2020-12-23

## 2021-01-19 ENCOUNTER — APPOINTMENT (OUTPATIENT)
Dept: HEART AND VASCULAR | Facility: CLINIC | Age: 76
End: 2021-01-19
Payer: MEDICARE

## 2021-01-19 LAB
25(OH)D3 SERPL-MCNC: 37.5 NG/ML
ALBUMIN SERPL ELPH-MCNC: 4.8 G/DL
ALP BLD-CCNC: 38 U/L
ALT SERPL-CCNC: 32 U/L
ANION GAP SERPL CALC-SCNC: 15 MMOL/L
APPEARANCE: CLEAR
AST SERPL-CCNC: 29 U/L
BASOPHILS # BLD AUTO: 0.12 K/UL
BASOPHILS NFR BLD AUTO: 1.8 %
BILIRUB SERPL-MCNC: 0.5 MG/DL
BILIRUBIN URINE: NEGATIVE
BLOOD URINE: NEGATIVE
BUN SERPL-MCNC: 23 MG/DL
CALCIUM SERPL-MCNC: 10.3 MG/DL
CHLORIDE SERPL-SCNC: 102 MMOL/L
CHOLEST SERPL-MCNC: 200 MG/DL
CO2 SERPL-SCNC: 25 MMOL/L
COLOR: YELLOW
CREAT SERPL-MCNC: 1.14 MG/DL
EOSINOPHIL # BLD AUTO: 0.97 K/UL
EOSINOPHIL NFR BLD AUTO: 14.5 %
ESTIMATED AVERAGE GLUCOSE: 126 MG/DL
GLUCOSE QUALITATIVE U: NEGATIVE
GLUCOSE SERPL-MCNC: 131 MG/DL
HBA1C MFR BLD HPLC: 6 %
HCT VFR BLD CALC: 44.7 %
HDLC SERPL-MCNC: 48 MG/DL
HGB BLD-MCNC: 14.6 G/DL
IMM GRANULOCYTES NFR BLD AUTO: 0.3 %
KETONES URINE: NEGATIVE
LDLC SERPL CALC-MCNC: 112 MG/DL
LEUKOCYTE ESTERASE URINE: NEGATIVE
LYMPHOCYTES # BLD AUTO: 1.92 K/UL
LYMPHOCYTES NFR BLD AUTO: 28.7 %
MAN DIFF?: NORMAL
MCHC RBC-ENTMCNC: 31.7 PG
MCHC RBC-ENTMCNC: 32.7 GM/DL
MCV RBC AUTO: 97.2 FL
MONOCYTES # BLD AUTO: 0.6 K/UL
MONOCYTES NFR BLD AUTO: 9 %
NEUTROPHILS # BLD AUTO: 3.06 K/UL
NEUTROPHILS NFR BLD AUTO: 45.7 %
NITRITE URINE: NEGATIVE
NONHDLC SERPL-MCNC: 152 MG/DL
PH URINE: 6
PLATELET # BLD AUTO: 320 K/UL
POTASSIUM SERPL-SCNC: 5 MMOL/L
PROT SERPL-MCNC: 7 G/DL
PROTEIN URINE: NEGATIVE
PSA FREE FLD-MCNC: 38 %
PSA FREE SERPL-MCNC: 0.16 NG/ML
PSA SERPL-MCNC: 0.42 NG/ML
RBC # BLD: 4.6 M/UL
RBC # FLD: 13.3 %
SODIUM SERPL-SCNC: 142 MMOL/L
SPECIFIC GRAVITY URINE: 1.02
TRIGL SERPL-MCNC: 197 MG/DL
TSH SERPL-ACNC: 1.61 UIU/ML
UROBILINOGEN URINE: NORMAL
WBC # FLD AUTO: 6.69 K/UL

## 2021-01-19 PROCEDURE — 36415 COLL VENOUS BLD VENIPUNCTURE: CPT

## 2021-01-29 ENCOUNTER — NON-APPOINTMENT (OUTPATIENT)
Age: 76
End: 2021-01-29

## 2021-01-29 ENCOUNTER — APPOINTMENT (OUTPATIENT)
Dept: INTERNAL MEDICINE | Facility: CLINIC | Age: 76
End: 2021-01-29
Payer: MEDICARE

## 2021-01-29 VITALS
RESPIRATION RATE: 15 BRPM | WEIGHT: 167 LBS | TEMPERATURE: 98 F | SYSTOLIC BLOOD PRESSURE: 144 MMHG | HEART RATE: 63 BPM | BODY MASS INDEX: 27.82 KG/M2 | HEIGHT: 65 IN | OXYGEN SATURATION: 95 % | DIASTOLIC BLOOD PRESSURE: 78 MMHG

## 2021-01-29 PROCEDURE — 99397 PER PM REEVAL EST PAT 65+ YR: CPT | Mod: 25

## 2021-01-29 PROCEDURE — 93000 ELECTROCARDIOGRAM COMPLETE: CPT

## 2021-01-29 PROCEDURE — 99072 ADDL SUPL MATRL&STAF TM PHE: CPT

## 2021-03-16 ENCOUNTER — APPOINTMENT (OUTPATIENT)
Dept: HEART AND VASCULAR | Facility: CLINIC | Age: 76
End: 2021-03-16
Payer: MEDICARE

## 2021-03-16 VITALS
WEIGHT: 167 LBS | HEART RATE: 63 BPM | HEIGHT: 65 IN | DIASTOLIC BLOOD PRESSURE: 78 MMHG | RESPIRATION RATE: 14 BRPM | TEMPERATURE: 98.2 F | SYSTOLIC BLOOD PRESSURE: 141 MMHG | OXYGEN SATURATION: 96 % | BODY MASS INDEX: 27.82 KG/M2

## 2021-03-16 PROCEDURE — 99214 OFFICE O/P EST MOD 30 MIN: CPT

## 2021-03-16 PROCEDURE — 99072 ADDL SUPL MATRL&STAF TM PHE: CPT

## 2021-03-16 RX ORDER — HYDROCHLOROTHIAZIDE 12.5 MG/1
12.5 TABLET ORAL
Qty: 30 | Refills: 0 | Status: DISCONTINUED | COMMUNITY
Start: 2020-01-22 | End: 2021-03-16

## 2021-03-16 RX ORDER — LOSARTAN POTASSIUM 100 MG/1
100 TABLET, FILM COATED ORAL
Qty: 30 | Refills: 0 | Status: DISCONTINUED | COMMUNITY
Start: 2020-01-22 | End: 2021-03-16

## 2021-03-16 NOTE — HISTORY OF PRESENT ILLNESS
[FreeTextEntry1] : 75 yo M with HTN DM, HLD, here for follow up \par  The patient reports feeling fine. \par Denies chest pain, sob, palpitations, syncope, presyncope. \par Reports episodes of hypertension ( BP> 180) after drinking wiskey ( report drinking 1 shot daily )\par Excellent exercise tolerance (walks> 2 hours daily ) \par \par \par PMH \par as above\par \par ALL\par NKDA\par \par MEDS\par metoprolol 100 mg daily \par losartan 100-25 mg daily (uptitrated on 3.26.2021) \par simvastatin 40 mg daily \par \par FH\par father  at age 57 of [place]\par \par SH\par no smoke, 1 bottle of wine/day, occasional wisky, no drugs\par \par EKG 2020\par SR, wnl\par \par Echocardiogram 2020\par Left ventricular ejection fraction is 60-65%\par Mild MR\par \par Exercise stress test \par The patient exercised for 7 minutes 58 seconds on a Neville protocol (94% of MPHR)\par EKG with 2 mm ST depression upsloping in V5 and V6 at peak exercise\par The study was stopped due to fatigue, No chest pain no arrhythmias during exercise or recovery\par \par LABS reviwed\par Lipid profile 2020 reviewed \par creatinine 1.24 \par \par \par \par

## 2021-03-16 NOTE — ASSESSMENT
[FreeTextEntry1] : 75 yo M with DM, HLD, here for followup. \par \par LIN \par -resolved \par - he has good exercise tolerance without any symptoms \par -negative exercise stress test \par -echo wnl\par -cont with BP control \par \par HTN\par -not optimally controlled\par -up titrate losartan-hctz from  100-12.5 to 100-25 mg daily \par -Recommended to call the clinic if blood pressure is > 140/90\par -cont with metoprolol 100 mg daily \par -cmp wnl\par -recc limiting amount of etoh\par \par HLD\par -fasting lipid panel reviewed \par -agree with fenofibrate and statin as per PMD \par -Recommended healthy diet and will repeat lipid panel in 4 months\par \par f/u in 4 months or sooner if any symptoms

## 2021-05-03 ENCOUNTER — APPOINTMENT (OUTPATIENT)
Dept: HEART AND VASCULAR | Facility: CLINIC | Age: 76
End: 2021-05-03
Payer: MEDICARE

## 2021-05-03 LAB
ESTIMATED AVERAGE GLUCOSE: 140 MG/DL
HBA1C MFR BLD HPLC: 6.5 %

## 2021-05-03 PROCEDURE — 36415 COLL VENOUS BLD VENIPUNCTURE: CPT

## 2021-05-03 PROCEDURE — 99072 ADDL SUPL MATRL&STAF TM PHE: CPT

## 2021-05-04 LAB
ALBUMIN SERPL ELPH-MCNC: 4.8 G/DL
ALP BLD-CCNC: 37 U/L
ALT SERPL-CCNC: 26 U/L
ANION GAP SERPL CALC-SCNC: 12 MMOL/L
AST SERPL-CCNC: 26 U/L
BILIRUB SERPL-MCNC: 0.4 MG/DL
BUN SERPL-MCNC: 23 MG/DL
CALCIUM SERPL-MCNC: 10 MG/DL
CHLORIDE SERPL-SCNC: 100 MMOL/L
CHOLEST SERPL-MCNC: 235 MG/DL
CO2 SERPL-SCNC: 27 MMOL/L
CREAT SERPL-MCNC: 1.2 MG/DL
GLUCOSE SERPL-MCNC: 143 MG/DL
HDLC SERPL-MCNC: 50 MG/DL
LDLC SERPL CALC-MCNC: 133 MG/DL
NONHDLC SERPL-MCNC: 184 MG/DL
POTASSIUM SERPL-SCNC: 4.9 MMOL/L
PROT SERPL-MCNC: 7.1 G/DL
SODIUM SERPL-SCNC: 140 MMOL/L
TRIGL SERPL-MCNC: 254 MG/DL

## 2021-05-05 ENCOUNTER — APPOINTMENT (OUTPATIENT)
Dept: INTERNAL MEDICINE | Facility: CLINIC | Age: 76
End: 2021-05-05
Payer: MEDICARE

## 2021-05-05 VITALS
HEIGHT: 65 IN | OXYGEN SATURATION: 95 % | TEMPERATURE: 98.3 F | HEART RATE: 58 BPM | SYSTOLIC BLOOD PRESSURE: 137 MMHG | RESPIRATION RATE: 14 BRPM | BODY MASS INDEX: 27.49 KG/M2 | DIASTOLIC BLOOD PRESSURE: 74 MMHG | WEIGHT: 165 LBS

## 2021-05-05 VITALS — SYSTOLIC BLOOD PRESSURE: 125 MMHG | DIASTOLIC BLOOD PRESSURE: 75 MMHG

## 2021-05-05 PROCEDURE — 99214 OFFICE O/P EST MOD 30 MIN: CPT

## 2021-05-05 PROCEDURE — 99072 ADDL SUPL MATRL&STAF TM PHE: CPT

## 2021-05-05 NOTE — ASSESSMENT
[FreeTextEntry1] : 76 Y OLD MALE WITH PMX OF HTN = STABLE \par DYSLIPIDEMIA ADN DM = LABS REVIEWED,ALL RX SENT \par RTO IN 3 M WITH LABS

## 2021-05-05 NOTE — HISTORY OF PRESENT ILLNESS
[de-identified] : PT COMES FOR F/U ;OFFERS NO NEW COMPLAINS \par SEEN BY CARDIO WHO INCREASED HCTZ FROM 12.5 TO 25 WITH THE LOSARTAN COMBO TABLET

## 2021-08-02 ENCOUNTER — APPOINTMENT (OUTPATIENT)
Dept: HEART AND VASCULAR | Facility: CLINIC | Age: 76
End: 2021-08-02
Payer: MEDICARE

## 2021-08-02 LAB
ALBUMIN SERPL ELPH-MCNC: 4.6 G/DL
ALP BLD-CCNC: 35 U/L
ALT SERPL-CCNC: 23 U/L
ANION GAP SERPL CALC-SCNC: 12 MMOL/L
AST SERPL-CCNC: 22 U/L
BILIRUB SERPL-MCNC: 0.4 MG/DL
BUN SERPL-MCNC: 29 MG/DL
CALCIUM SERPL-MCNC: 9.9 MG/DL
CHLORIDE SERPL-SCNC: 100 MMOL/L
CHOLEST SERPL-MCNC: 235 MG/DL
CO2 SERPL-SCNC: 28 MMOL/L
CREAT SERPL-MCNC: 1.13 MG/DL
ESTIMATED AVERAGE GLUCOSE: 157 MG/DL
GLUCOSE SERPL-MCNC: 197 MG/DL
HBA1C MFR BLD HPLC: 7.1 %
HDLC SERPL-MCNC: 48 MG/DL
LDLC SERPL CALC-MCNC: 139 MG/DL
NONHDLC SERPL-MCNC: 187 MG/DL
POTASSIUM SERPL-SCNC: 4.6 MMOL/L
PROT SERPL-MCNC: 6.9 G/DL
SODIUM SERPL-SCNC: 140 MMOL/L
TRIGL SERPL-MCNC: 242 MG/DL

## 2021-08-02 PROCEDURE — 36415 COLL VENOUS BLD VENIPUNCTURE: CPT

## 2021-08-04 ENCOUNTER — APPOINTMENT (OUTPATIENT)
Dept: INTERNAL MEDICINE | Facility: CLINIC | Age: 76
End: 2021-08-04
Payer: MEDICARE

## 2021-08-04 VITALS
HEART RATE: 67 BPM | SYSTOLIC BLOOD PRESSURE: 135 MMHG | OXYGEN SATURATION: 97 % | DIASTOLIC BLOOD PRESSURE: 71 MMHG | BODY MASS INDEX: 28.16 KG/M2 | RESPIRATION RATE: 14 BRPM | HEIGHT: 65 IN | WEIGHT: 169 LBS | TEMPERATURE: 97.8 F

## 2021-08-04 PROCEDURE — 99214 OFFICE O/P EST MOD 30 MIN: CPT

## 2021-08-04 RX ORDER — ERGOCALCIFEROL 1.25 MG/1
1.25 MG CAPSULE, LIQUID FILLED ORAL
Qty: 6 | Refills: 0 | Status: DISCONTINUED | COMMUNITY
Start: 2017-03-13 | End: 2021-08-04

## 2021-08-04 NOTE — ASSESSMENT
[FreeTextEntry1] : 76 Y OLD MALE WITH PMX OF HTN ,DYSLIPIDEMIA AND DM = LABS DISCUSSED\par MEDS RX SENT RTO IN 3 M

## 2021-08-04 NOTE — COUNSELING
[Hazards of at-risk alcohol use discussed] : Hazards of at-risk alcohol use discussed [Encouraged to increase physical activity] : Encouraged to increase physical activity

## 2021-11-02 ENCOUNTER — APPOINTMENT (OUTPATIENT)
Dept: HEART AND VASCULAR | Facility: CLINIC | Age: 76
End: 2021-11-02
Payer: MEDICARE

## 2021-11-02 LAB
ALBUMIN SERPL ELPH-MCNC: 4.8 G/DL
ALP BLD-CCNC: 36 U/L
ALT SERPL-CCNC: 23 U/L
ANION GAP SERPL CALC-SCNC: 14 MMOL/L
AST SERPL-CCNC: 21 U/L
BILIRUB SERPL-MCNC: 0.4 MG/DL
BUN SERPL-MCNC: 26 MG/DL
CALCIUM SERPL-MCNC: 10.3 MG/DL
CHLORIDE SERPL-SCNC: 103 MMOL/L
CHOLEST SERPL-MCNC: 177 MG/DL
CO2 SERPL-SCNC: 26 MMOL/L
CREAT SERPL-MCNC: 1.16 MG/DL
ESTIMATED AVERAGE GLUCOSE: 137 MG/DL
GLUCOSE SERPL-MCNC: 153 MG/DL
HBA1C MFR BLD HPLC: 6.4 %
HDLC SERPL-MCNC: 45 MG/DL
LDLC SERPL CALC-MCNC: 96 MG/DL
NONHDLC SERPL-MCNC: 132 MG/DL
POTASSIUM SERPL-SCNC: 5 MMOL/L
PROT SERPL-MCNC: 7 G/DL
SODIUM SERPL-SCNC: 143 MMOL/L
TRIGL SERPL-MCNC: 180 MG/DL

## 2021-11-02 PROCEDURE — 36415 COLL VENOUS BLD VENIPUNCTURE: CPT

## 2021-11-05 ENCOUNTER — APPOINTMENT (OUTPATIENT)
Dept: INTERNAL MEDICINE | Facility: CLINIC | Age: 76
End: 2021-11-05
Payer: MEDICARE

## 2021-11-05 VITALS
OXYGEN SATURATION: 98 % | RESPIRATION RATE: 14 BRPM | DIASTOLIC BLOOD PRESSURE: 66 MMHG | HEART RATE: 60 BPM | TEMPERATURE: 97.6 F | BODY MASS INDEX: 27.32 KG/M2 | SYSTOLIC BLOOD PRESSURE: 141 MMHG | WEIGHT: 164 LBS | HEIGHT: 65 IN

## 2021-11-05 PROCEDURE — 99214 OFFICE O/P EST MOD 30 MIN: CPT | Mod: 25

## 2021-11-05 PROCEDURE — 90662 IIV NO PRSV INCREASED AG IM: CPT

## 2021-11-05 PROCEDURE — 90471 IMMUNIZATION ADMIN: CPT

## 2021-11-05 NOTE — ASSESSMENT
[FreeTextEntry1] : 76 Y OLD MALE WITH PMX OF HTN ,DM AND DYSLIPIDEMIA = LABS RESULTS DISCUSSED\par CONTINUE SAME MEDS\par INFLUENZA VACCINE TODAY \par HAD MRNA BOOSTER COVID-19 ALREADY

## 2022-02-01 ENCOUNTER — APPOINTMENT (OUTPATIENT)
Dept: HEART AND VASCULAR | Facility: CLINIC | Age: 77
End: 2022-02-01
Payer: MEDICARE

## 2022-02-01 LAB
25(OH)D3 SERPL-MCNC: 26.5 NG/ML
ALBUMIN SERPL ELPH-MCNC: 4.6 G/DL
ALP BLD-CCNC: 33 U/L
ALT SERPL-CCNC: 30 U/L
ANION GAP SERPL CALC-SCNC: 12 MMOL/L
AST SERPL-CCNC: 30 U/L
BASOPHILS # BLD AUTO: 0.1 K/UL
BASOPHILS NFR BLD AUTO: 1.8 %
BILIRUB SERPL-MCNC: 0.6 MG/DL
BUN SERPL-MCNC: 23 MG/DL
CALCIUM SERPL-MCNC: 10 MG/DL
CHLORIDE SERPL-SCNC: 102 MMOL/L
CHOLEST SERPL-MCNC: 179 MG/DL
CO2 SERPL-SCNC: 28 MMOL/L
CREAT SERPL-MCNC: 1.18 MG/DL
EOSINOPHIL # BLD AUTO: 0.52 K/UL
EOSINOPHIL NFR BLD AUTO: 9.5 %
ESTIMATED AVERAGE GLUCOSE: 131 MG/DL
GLUCOSE SERPL-MCNC: 123 MG/DL
HBA1C MFR BLD HPLC: 6.2 %
HCT VFR BLD CALC: 45.4 %
HDLC SERPL-MCNC: 48 MG/DL
HGB BLD-MCNC: 14.5 G/DL
IMM GRANULOCYTES NFR BLD AUTO: 0.2 %
LDLC SERPL CALC-MCNC: 101 MG/DL
LYMPHOCYTES # BLD AUTO: 1.92 K/UL
LYMPHOCYTES NFR BLD AUTO: 35.2 %
MAN DIFF?: NORMAL
MCHC RBC-ENTMCNC: 30.7 PG
MCHC RBC-ENTMCNC: 31.9 GM/DL
MCV RBC AUTO: 96.2 FL
MONOCYTES # BLD AUTO: 0.49 K/UL
MONOCYTES NFR BLD AUTO: 9 %
NEUTROPHILS # BLD AUTO: 2.41 K/UL
NEUTROPHILS NFR BLD AUTO: 44.3 %
NONHDLC SERPL-MCNC: 130 MG/DL
PLATELET # BLD AUTO: 285 K/UL
POTASSIUM SERPL-SCNC: 5.1 MMOL/L
PROT SERPL-MCNC: 7 G/DL
PSA FREE FLD-MCNC: 34 %
PSA FREE SERPL-MCNC: 0.16 NG/ML
PSA SERPL-MCNC: 0.46 NG/ML
RBC # BLD: 4.72 M/UL
RBC # FLD: 13.3 %
SODIUM SERPL-SCNC: 141 MMOL/L
TRIGL SERPL-MCNC: 145 MG/DL
TSH SERPL-ACNC: 1.64 UIU/ML
VIT B12 SERPL-MCNC: 756 PG/ML
WBC # FLD AUTO: 5.45 K/UL

## 2022-02-01 PROCEDURE — 36415 COLL VENOUS BLD VENIPUNCTURE: CPT

## 2022-02-02 LAB
APPEARANCE: CLEAR
BILIRUBIN URINE: NEGATIVE
BLOOD URINE: NEGATIVE
COLOR: YELLOW
CREAT SPEC-SCNC: 159 MG/DL
GLUCOSE QUALITATIVE U: NEGATIVE
KETONES URINE: NEGATIVE
LEUKOCYTE ESTERASE URINE: NEGATIVE
MICROALBUMIN 24H UR DL<=1MG/L-MCNC: 1.7 MG/DL
MICROALBUMIN/CREAT 24H UR-RTO: 11 MG/G
NITRITE URINE: NEGATIVE
PH URINE: 6
PROTEIN URINE: NEGATIVE
SPECIFIC GRAVITY URINE: 1.02
UROBILINOGEN URINE: NORMAL

## 2022-02-07 ENCOUNTER — APPOINTMENT (OUTPATIENT)
Dept: INTERNAL MEDICINE | Facility: CLINIC | Age: 77
End: 2022-02-07
Payer: MEDICARE

## 2022-02-07 ENCOUNTER — NON-APPOINTMENT (OUTPATIENT)
Age: 77
End: 2022-02-07

## 2022-02-07 VITALS
TEMPERATURE: 98 F | HEART RATE: 64 BPM | OXYGEN SATURATION: 96 % | BODY MASS INDEX: 27.49 KG/M2 | HEIGHT: 65 IN | WEIGHT: 165 LBS | DIASTOLIC BLOOD PRESSURE: 72 MMHG | SYSTOLIC BLOOD PRESSURE: 158 MMHG

## 2022-02-07 VITALS — DIASTOLIC BLOOD PRESSURE: 70 MMHG | SYSTOLIC BLOOD PRESSURE: 142 MMHG

## 2022-02-07 PROCEDURE — 99397 PER PM REEVAL EST PAT 65+ YR: CPT

## 2022-02-07 PROCEDURE — 93000 ELECTROCARDIOGRAM COMPLETE: CPT

## 2022-02-07 NOTE — ASSESSMENT
[FreeTextEntry1] : CPE OF 76 Y OLD MALE WITH PMX OF HTN ,DM ,DYSLIPIDEMIA = LABS REVIEWED,EKG TODAY \par LIN = CARDIO EVAL \par LOW VIT D= START VIT D3 34718 IU DAILY \par BPH SX = START FLOMAX 0.4 MG AT HS ,IF NOT BETTER WILL SEE  \par RTO 3 M WITH LABS

## 2022-02-07 NOTE — REVIEW OF SYSTEMS
[Vision Problems] : vision problems [Nocturia] : nocturia [Frequency] : frequency [Negative] : Heme/Lymph

## 2022-02-07 NOTE — HISTORY OF PRESENT ILLNESS
[de-identified] : CC OF LIN 1 M AGO WHILE WALKING WITH NO OTHER SX  AND NO SX SINCE THEN \par CC OF INCREASED URINARY FREQUENCY AND NOCTURIA X2 LAST FEW MONTHS\par HAD COLONOSCOPY ABOUT 2 Y AGO \par LAST OPHTHA 2 M AGO WITH FLOATERS LEFT EYE

## 2022-02-15 ENCOUNTER — APPOINTMENT (OUTPATIENT)
Dept: HEART AND VASCULAR | Facility: CLINIC | Age: 77
End: 2022-02-15
Payer: MEDICARE

## 2022-02-15 VITALS
OXYGEN SATURATION: 98 % | RESPIRATION RATE: 16 BRPM | HEIGHT: 65 IN | BODY MASS INDEX: 27.49 KG/M2 | DIASTOLIC BLOOD PRESSURE: 68 MMHG | TEMPERATURE: 98.5 F | SYSTOLIC BLOOD PRESSURE: 137 MMHG | HEART RATE: 91 BPM | WEIGHT: 165 LBS

## 2022-02-15 DIAGNOSIS — R07.89 OTHER CHEST PAIN: ICD-10-CM

## 2022-02-15 PROCEDURE — 99214 OFFICE O/P EST MOD 30 MIN: CPT

## 2022-02-15 NOTE — PHYSICAL EXAM
[General Appearance - Well Developed] : well developed [Normal Appearance] : normal appearance [General Appearance - Well Nourished] : well nourished [Well Groomed] : well groomed [No Deformities] : no deformities [General Appearance - In No Acute Distress] : no acute distress [Heart Rate And Rhythm] : heart rate and rhythm were normal [Heart Sounds] : normal S1 and S2 [Murmurs] : no murmurs present [Respiration, Rhythm And Depth] : normal respiratory rhythm and effort [Exaggerated Use Of Accessory Muscles For Inspiration] : no accessory muscle use [Auscultation Breath Sounds / Voice Sounds] : lungs were clear to auscultation bilaterally [Abdomen Soft] : soft [Abdomen Tenderness] : non-tender [Abdomen Mass (___ Cm)] : no abdominal mass palpated [Abnormal Walk] : normal gait [Gait - Sufficient For Exercise Testing] : the gait was sufficient for exercise testing [Nail Clubbing] : no clubbing of the fingernails [Cyanosis, Localized] : no localized cyanosis [Petechial Hemorrhages (___cm)] : no petechial hemorrhages [] : no ischemic changes [Well Developed] : well developed [Well Nourished] : well nourished [No Acute Distress] : no acute distress [Normal Conjunctiva] : normal conjunctiva [Normal Venous Pressure] : normal venous pressure [No Carotid Bruit] : no carotid bruit [Normal S1, S2] : normal S1, S2 [No Murmur] : no murmur [No Rub] : no rub [No Gallop] : no gallop [Clear Lung Fields] : clear lung fields [Good Air Entry] : good air entry [No Respiratory Distress] : no respiratory distress  [Soft] : abdomen soft [Non Tender] : non-tender [No Masses/organomegaly] : no masses/organomegaly [Normal Bowel Sounds] : normal bowel sounds [Normal Gait] : normal gait [No Edema] : no edema [No Cyanosis] : no cyanosis [No Clubbing] : no clubbing [No Varicosities] : no varicosities [No Rash] : no rash [No Skin Lesions] : no skin lesions [Moves all extremities] : moves all extremities [No Focal Deficits] : no focal deficits [Normal Speech] : normal speech [Alert and Oriented] : alert and oriented [Normal memory] : normal memory

## 2022-02-16 NOTE — ASSESSMENT
[FreeTextEntry1] : 75 yo M with DM, HLD, here for followup. \par \par Chest Pain/LIN\par -In setting of symptoms and risk factors will obtain a cCTA\par -Echo 1/2020 WNL, repeat today to r/o ant WMA\par -Recommended to call the clinic immediately if new onset of chest pain\par \par HTN\par -well controlled today \par -Continue  losartan-hctz  100-25 mg daily \par -Recommended to call the clinic if blood pressure is > 140/90\par -cont with metoprolol 100 mg daily \par -Cr 1.18 2/1/2022\par -recc limiting amount of etoh\par \par HLD\par - 2/1/2022\par -continue  with fenofibrate and statin as per PMD \par -Recommended healthy diet/exercise\par \par F/U in two weeks for echo/cCTA results and symptom assessment

## 2022-02-16 NOTE — HISTORY OF PRESENT ILLNESS
[FreeTextEntry1] : 75 yo M with HTN DM, HLD, here for follow up\par \par Reports LIN one month ago while walking to the store. Self resolved after a half an hour. No new episodes since. \par Reports occasional pinching  like pain in left upper chest happens every couple of weeks, lasts 3-5 minutes. Happens at rest and and with activity. \par Denies Syncope/presyncope/palpitations/LE edema\par Walks 1.5 to 2 h daily\par Drinks 2-3 glasses of wine/day\par Compliant with meds\par \par PMH \par as above\par \par ALL\par NKDA\par \par MEDS\par metoprolol 100 mg daily \par losartan HCTZ 100-25 mg daily (uptitrated on 3.26.2021) \par simvastatin 40 mg daily \par \par FH\par father  at age 55 of MI\par \par SH\par no smoke, 1 bottle of wine/day, occasional wisky, no drugs\par \par EKG 2022 SR, WNL, HR 58BPM\par EKG 2020\par SR, wnl\par \par Echocardiogram 2020\par Left ventricular ejection fraction is 60-65%\par Mild MR\par \par Exercise stress test \par The patient exercised for 7 minutes 58 seconds on a [place]ille protocol (94% of MPHR)\par EKG with 2 mm ST depression upsloping in V5 and V6 at peak exercise\par The study was stopped due to fatigue, No chest pain no arrhythmias during exercise or recovery\par \par Labs 2022\par Cr 1.18\par Tri 145\par Chol 179\par HDL 48\par \par \par 2020\par creatinine 1.24 \par \par \par \par

## 2022-02-22 ENCOUNTER — APPOINTMENT (OUTPATIENT)
Dept: HEART AND VASCULAR | Facility: CLINIC | Age: 77
End: 2022-02-22
Payer: MEDICARE

## 2022-02-22 PROCEDURE — 93306 TTE W/DOPPLER COMPLETE: CPT

## 2022-03-03 ENCOUNTER — NON-APPOINTMENT (OUTPATIENT)
Age: 77
End: 2022-03-03

## 2022-03-03 ENCOUNTER — APPOINTMENT (OUTPATIENT)
Dept: HEART AND VASCULAR | Facility: CLINIC | Age: 77
End: 2022-03-03
Payer: MEDICARE

## 2022-03-03 PROCEDURE — 99442: CPT

## 2022-03-07 ENCOUNTER — APPOINTMENT (OUTPATIENT)
Dept: HEART AND VASCULAR | Facility: CLINIC | Age: 77
End: 2022-03-07
Payer: MEDICARE

## 2022-03-07 DIAGNOSIS — Z01.812 ENCOUNTER FOR PREPROCEDURAL LABORATORY EXAMINATION: ICD-10-CM

## 2022-03-07 PROCEDURE — ZZZZZ: CPT

## 2022-03-08 VITALS
RESPIRATION RATE: 17 BRPM | DIASTOLIC BLOOD PRESSURE: 67 MMHG | TEMPERATURE: 98 F | WEIGHT: 164.91 LBS | SYSTOLIC BLOOD PRESSURE: 156 MMHG | OXYGEN SATURATION: 95 % | HEART RATE: 52 BPM | HEIGHT: 65 IN

## 2022-03-08 LAB — SARS-COV-2 N GENE NPH QL NAA+PROBE: NOT DETECTED

## 2022-03-08 NOTE — H&P ADULT - HISTORY OF PRESENT ILLNESS
COVID: _______  Cardiologist: Dr. Hairston   Pharmacy: [place] [id number], [phone]  Escort: _______    76 y/o male w/ FHx MI (father  at age 55) and PMHx HTN, HLD, type II DM, and BPH who presented to outpatient cardiologist, Dr. Hairston, endorsing an episode of LIN while walking to the store approximately one month ago that self-resolved after about a half an hour. Patient reports he has not had any additional episodes; however, patient does admit to occasional pinching pain in the left upper chest region occurring every couple of weeks, experienced w/ exertion and at rest, and lasting about 3-5 minutes. Patient reports that he is able to walk 1.5 hours to 2 hours daily. Patient denies ________. Echocardiogram (2022) showed EF 55-60%, mild mitral annular calcification, normal trileaflet aortic valve, and no evidence of pulmonary hypertension. CCTA (2022) revealed Ca score 1125 Agatston units, proximal LAD 50-70%, proximal RCA 70-90%.     In light of patient's risk factors, CCS Class ______, and abnormal CCTA results, patient now presents for cardiac catheterization w/ possible intervention if clinically indicated.  COVID: Negative 2022, in HIE   Cardiologist: Dr. Hairston   Pharmacy: [place] [id number], [phone]  Escort: Daughter, Wife     76 y/o male w/ FHx MI (father  at age 55) and PMHx HTN, HLD, type II DM, and BPH who presented to outpatient cardiologist, Dr. Hairston, endorsing an episode of LIN while walking to the store approximately one month ago that self-resolved after about a half an hour. Patient reports he has not had any additional episodes; however, patient does admit to occasional pinching pain in the left upper chest region occurring every couple of weeks, experienced w/ exertion and at rest, and lasting about 3-5 minutes. Patient reports that he is able to walk 1.5 hours to 2 hours daily. Patient denies any dizziness, syncope, palpitations, abdominal pain, nausea/vomiting, melena, LE edema, fever, chills, cough. Echocardiogram (2022) showed EF 55-60%, mild mitral annular calcification, normal trileaflet aortic valve, and no evidence of pulmonary hypertension. CCTA (2022) revealed Ca score 1125 Agatston units, proximal LAD 50-70%, proximal RCA 70-90%.     In light of patient's risk factors, CCS Class IV anginal symptoms, and abnormal CCTA results, patient now presents for cardiac catheterization w/ possible intervention if clinically indicated.  COVID: Negative 2022, in HIE   Cardiologist: Dr. Hairston   Pharmacy: [place] [id number], [phone]  Escort: Daughter, Wife     78 y/o male w/ FHx MI (father  at age 55) and PMHx HTN, HLD, type II DM, and BPH who presented to outpatient cardiologist, Dr. Hairston, endorsing an episode of LIN while walking to the store approximately one month ago that self-resolved after about a half an hour. Patient reports he has not had any additional episodes; however, patient does admit to occasional pinching pain in the left upper chest region occurring every couple of weeks, experienced w/ exertion and at rest, and lasting about 3-5 minutes. Patient reports that he is able to walk 1.5 hours to 2 hours daily. Patient denies any dizziness, syncope, palpitations, abdominal pain, nausea/vomiting, melena, LE edema, fever, chills, cough. Echocardiogram (2022) showed EF 55-60%, mild mitral annular calcification, trace MR, trivial TR, normal trileaflet aortic valve, and no evidence of pulmonary hypertension. CCTA (2022) revealed Ca score 1125 Agatston units, proximal LAD 50-70%, proximal RCA 70-90%.     In light of patient's risk factors, CCS Class IV anginal symptoms, and abnormal CCTA results, patient now presents for cardiac catheterization w/ possible intervention if clinically indicated.  COVID: Negative 2022, in HIE   Cardiologist: Dr. Hairston   Pharmacy: [place] [id number], [phone]  Escort: Daughter, Wife     Medications confirmed with pt's medication bottles    76 y/o male w/ FHx MI (father  at age 55) and PMHx HTN, HLD, type II DM, and BPH who presented to outpatient cardiologist, Dr. Hairston, endorsing an episode of LIN while walking to the store approximately one month ago that self-resolved after about a half an hour. Patient reports he has not had any additional episodes; however, patient does admit to occasional pinching pain in the left upper chest region occurring every couple of weeks, experienced w/ exertion and at rest, and lasting about 3-5 minutes. Patient reports that he is able to walk 1.5 hours to 2 hours daily. Patient denies any dizziness, syncope, palpitations, abdominal pain, nausea/vomiting, melena, LE edema, fever, chills, cough. Echocardiogram (2022) showed EF 55-60%, mild mitral annular calcification, trace MR, trivial TR, normal trileaflet aortic valve, and no evidence of pulmonary hypertension. CCTA (2022) revealed Ca score 1125 Agatston units, proximal LAD 50-70%, proximal RCA 70-90%.     In light of patient's risk factors, CCS Class IV anginal symptoms, and abnormal CCTA results, patient now presents for cardiac catheterization w/ possible intervention if clinically indicated.

## 2022-03-08 NOTE — H&P ADULT - NSHPLABSRESULTS_GEN_ALL_CORE
ECG: SB @ 53bpm, no STT changes ECG: SB @ 53bpm, no STT changes                        14.5   5.27  )-----------( 288      ( 09 Mar 2022 07:31 )             43.4       03-09    139  |  101  |  26<H>  ----------------------------<  150<H>  4.2   |  25  |  1.09    Ca    9.8      09 Mar 2022 07:31    TPro  7.3  /  Alb  4.8  /  TBili  0.5  /  DBili  x   /  AST  25  /  ALT  23  /  AlkPhos  36<L>  03-09      PT/INR - ( 09 Mar 2022 07:31 )   PT: 11.1 sec;   INR: 0.93          PTT - ( 09 Mar 2022 07:31 )  PTT:34.9 sec    CARDIAC MARKERS ( 09 Mar 2022 07:31 )  x     / x     / 58 U/L / x     / 2.2 ng/mL

## 2022-03-08 NOTE — H&P ADULT - NSHPSOCIALHISTORY_GEN_ALL_CORE
Patient endorses consuming 2-3 glasses of wine per day and occasional whiskey. Patient denies any tobacco or illicit drug use.

## 2022-03-08 NOTE — H&P ADULT - NSICDXPASTMEDICALHX_GEN_ALL_CORE_FT
PAST MEDICAL HISTORY:  BPH (benign prostatic hyperplasia)     Hyperlipidemia     Hypertension     Type 2 diabetes mellitus

## 2022-03-08 NOTE — H&P ADULT - ASSESSMENT
76 y/o male w/ FHx MI (father  at age 55) and PMHx HTN, HLD, type II DM, and BPH who in light of patient's risk factors, CCS Class IV anginal symptoms, and abnormal CCTA results, patient now presents for cardiac catheterization w/ possible intervention if clinically indicated.     ASA, Mallampati    Hgb/HCT:   NS     Pt is a candidate for moderate sedation: Yes    Risks & benefits of procedure and alternative therapy have been explained to the patient including but not limited to: allergic reaction, bleeding w/possible need for blood transfusion, infection, renal and vascular compromise, limb damage, arrhythmia, stroke, vessel dissection/perforation, Myocardial infarction, emergent CABG. Informed consent obtained and in chart.   76 y/o male w/ FHx MI (father  at age 55) and PMHx HTN, HLD, type II DM, and BPH who in light of patient's risk factors, CCS Class IV anginal symptoms, and abnormal CCTA results, patient now presents for cardiac catheterization w/ possible intervention if clinically indicated.     ASA, Mallampati    Hgb/HCT: 14.5/43.4. pt denies bleeding, melena, BRBPR, hematuria. Pt reports compliance with Aspirin, last dose this AM. Ordered Plavix 600mg PO x1.    cc bolus. EF 55-60% by ECHO. Pt is euvolemic on exam. Cr. 1.09.   - Simva 40mg at home   MISS ordered    Pt is a candidate for moderate sedation: Yes    Pt was consented for procedure with  ID # [id number]  Risks & benefits of procedure and alternative therapy have been explained to the patient including but not limited to: allergic reaction, bleeding w/possible need for blood transfusion, infection, renal and vascular compromise, limb damage, arrhythmia, stroke, vessel dissection/perforation, Myocardial infarction, emergent CABG. Informed consent obtained and in chart.   78 y/o male w/ FHx MI (father  at age 55) and PMHx HTN, HLD, type II DM, and BPH who in light of patient's risk factors, CCS Class IV anginal symptoms, and abnormal CCTA results, patient now presents for cardiac catheterization w/ possible intervention if clinically indicated.     ASA, Mallampati    Hgb/HCT: 14.5/43.4. pt denies bleeding, melena, BRBPR, hematuria. Pt reports compliance with Aspirin, last dose this AM. Ordered Plavix 600mg PO x1.    cc bolus. EF 55-60% by ECHO. Pt is euvolemic on exam. Cr. 1.09.   , Trig 164- Simva 40mg and Fenofibric acid 135mg at home   MISS ordered    Pt is a candidate for moderate sedation: Yes    Pt was consented for procedure with  ID # [id number]  Risks & benefits of procedure and alternative therapy have been explained to the patient including but not limited to: allergic reaction, bleeding w/possible need for blood transfusion, infection, renal and vascular compromise, limb damage, arrhythmia, stroke, vessel dissection/perforation, Myocardial infarction, emergent CABG. Informed consent obtained and in chart.   78 y/o male w/ FHx MI (father  at age 55) and PMHx HTN, HLD, type II DM, and BPH who in light of patient's risk factors, CCS Class IV anginal symptoms, and abnormal CCTA results, patient now presents for cardiac catheterization w/ possible intervention if clinically indicated.     ASAn II, Mallampati II    Hgb/HCT: 14.5/43.4. pt denies bleeding, melena, BRBPR, hematuria. Pt reports compliance with Aspirin, last dose this AM. Ordered Plavix 600mg PO x1.    cc bolus. EF 55-60% by ECHO. Pt is euvolemic on exam. Cr. 1.09.   , Trig 164- Simva 40mg and Fenofibric acid 135mg at home   MISS ordered    Pt is a candidate for moderate sedation: Yes    Pt was consented for procedure with  ID # [id number]  Risks & benefits of procedure and alternative therapy have been explained to the patient including but not limited to: allergic reaction, bleeding w/possible need for blood transfusion, infection, renal and vascular compromise, limb damage, arrhythmia, stroke, vessel dissection/perforation, Myocardial infarction, emergent CABG. Informed consent obtained and in chart.

## 2022-03-09 ENCOUNTER — INPATIENT (INPATIENT)
Facility: HOSPITAL | Age: 77
LOS: 0 days | Discharge: HOME | DRG: 247 | End: 2022-03-10
Attending: INTERNAL MEDICINE | Admitting: INTERNAL MEDICINE
Payer: COMMERCIAL

## 2022-03-09 ENCOUNTER — TRANSCRIPTION ENCOUNTER (OUTPATIENT)
Age: 77
End: 2022-03-09

## 2022-03-09 LAB
A1C WITH ESTIMATED AVERAGE GLUCOSE RESULT: 5.9 % — HIGH (ref 4–5.6)
ALBUMIN SERPL ELPH-MCNC: 4.8 G/DL — SIGNIFICANT CHANGE UP (ref 3.3–5)
ALP SERPL-CCNC: 36 U/L — LOW (ref 40–120)
ALT FLD-CCNC: 23 U/L — SIGNIFICANT CHANGE UP (ref 10–45)
ANION GAP SERPL CALC-SCNC: 13 MMOL/L — SIGNIFICANT CHANGE UP (ref 5–17)
APTT BLD: 34.9 SEC — SIGNIFICANT CHANGE UP (ref 27.5–35.5)
AST SERPL-CCNC: 25 U/L — SIGNIFICANT CHANGE UP (ref 10–40)
BASOPHILS # BLD AUTO: 0.1 K/UL — SIGNIFICANT CHANGE UP (ref 0–0.2)
BASOPHILS NFR BLD AUTO: 1.9 % — SIGNIFICANT CHANGE UP (ref 0–2)
BILIRUB SERPL-MCNC: 0.5 MG/DL — SIGNIFICANT CHANGE UP (ref 0.2–1.2)
BUN SERPL-MCNC: 26 MG/DL — HIGH (ref 7–23)
CALCIUM SERPL-MCNC: 9.8 MG/DL — SIGNIFICANT CHANGE UP (ref 8.4–10.5)
CHLORIDE SERPL-SCNC: 101 MMOL/L — SIGNIFICANT CHANGE UP (ref 96–108)
CHOLEST SERPL-MCNC: 193 MG/DL — SIGNIFICANT CHANGE UP
CK MB CFR SERPL CALC: 2.2 NG/ML — SIGNIFICANT CHANGE UP (ref 0–6.7)
CK SERPL-CCNC: 58 U/L — SIGNIFICANT CHANGE UP (ref 30–200)
CO2 SERPL-SCNC: 25 MMOL/L — SIGNIFICANT CHANGE UP (ref 22–31)
CREAT SERPL-MCNC: 1.09 MG/DL — SIGNIFICANT CHANGE UP (ref 0.5–1.3)
EGFR: 71 ML/MIN/1.73M2 — SIGNIFICANT CHANGE UP
EOSINOPHIL # BLD AUTO: 0.44 K/UL — SIGNIFICANT CHANGE UP (ref 0–0.5)
EOSINOPHIL NFR BLD AUTO: 8.3 % — HIGH (ref 0–6)
ESTIMATED AVERAGE GLUCOSE: 123 MG/DL — HIGH (ref 68–114)
GLUCOSE BLDC GLUCOMTR-MCNC: 116 MG/DL — HIGH (ref 70–99)
GLUCOSE BLDC GLUCOMTR-MCNC: 118 MG/DL — HIGH (ref 70–99)
GLUCOSE BLDC GLUCOMTR-MCNC: 134 MG/DL — HIGH (ref 70–99)
GLUCOSE SERPL-MCNC: 150 MG/DL — HIGH (ref 70–99)
HCT VFR BLD CALC: 43.4 % — SIGNIFICANT CHANGE UP (ref 39–50)
HDLC SERPL-MCNC: 47 MG/DL — SIGNIFICANT CHANGE UP
HGB BLD-MCNC: 14.5 G/DL — SIGNIFICANT CHANGE UP (ref 13–17)
IMM GRANULOCYTES NFR BLD AUTO: 0.4 % — SIGNIFICANT CHANGE UP (ref 0–1.5)
INR BLD: 0.93 — SIGNIFICANT CHANGE UP (ref 0.88–1.16)
ISTAT INR: 1 — SIGNIFICANT CHANGE UP (ref 0.88–1.16)
ISTAT PT: 11.7 SEC — SIGNIFICANT CHANGE UP (ref 10–12.9)
LIPID PNL WITH DIRECT LDL SERPL: 113 MG/DL — HIGH
LYMPHOCYTES # BLD AUTO: 1.53 K/UL — SIGNIFICANT CHANGE UP (ref 1–3.3)
LYMPHOCYTES # BLD AUTO: 29 % — SIGNIFICANT CHANGE UP (ref 13–44)
MCHC RBC-ENTMCNC: 31.7 PG — SIGNIFICANT CHANGE UP (ref 27–34)
MCHC RBC-ENTMCNC: 33.4 GM/DL — SIGNIFICANT CHANGE UP (ref 32–36)
MCV RBC AUTO: 95 FL — SIGNIFICANT CHANGE UP (ref 80–100)
MONOCYTES # BLD AUTO: 0.49 K/UL — SIGNIFICANT CHANGE UP (ref 0–0.9)
MONOCYTES NFR BLD AUTO: 9.3 % — SIGNIFICANT CHANGE UP (ref 2–14)
NEUTROPHILS # BLD AUTO: 2.69 K/UL — SIGNIFICANT CHANGE UP (ref 1.8–7.4)
NEUTROPHILS NFR BLD AUTO: 51.1 % — SIGNIFICANT CHANGE UP (ref 43–77)
NON HDL CHOLESTEROL: 146 MG/DL — HIGH
NRBC # BLD: 0 /100 WBCS — SIGNIFICANT CHANGE UP (ref 0–0)
PLATELET # BLD AUTO: 288 K/UL — SIGNIFICANT CHANGE UP (ref 150–400)
POCT ISTAT CREATININE: 1.1 MG/DL — SIGNIFICANT CHANGE UP (ref 0.5–1.3)
POTASSIUM SERPL-MCNC: 4.2 MMOL/L — SIGNIFICANT CHANGE UP (ref 3.5–5.3)
POTASSIUM SERPL-SCNC: 4.2 MMOL/L — SIGNIFICANT CHANGE UP (ref 3.5–5.3)
PROT SERPL-MCNC: 7.3 G/DL — SIGNIFICANT CHANGE UP (ref 6–8.3)
PROTHROM AB SERPL-ACNC: 11.1 SEC — SIGNIFICANT CHANGE UP (ref 10.5–13.4)
RBC # BLD: 4.57 M/UL — SIGNIFICANT CHANGE UP (ref 4.2–5.8)
RBC # FLD: 13.3 % — SIGNIFICANT CHANGE UP (ref 10.3–14.5)
SODIUM SERPL-SCNC: 139 MMOL/L — SIGNIFICANT CHANGE UP (ref 135–145)
TRIGL SERPL-MCNC: 164 MG/DL — HIGH
WBC # BLD: 5.27 K/UL — SIGNIFICANT CHANGE UP (ref 3.8–10.5)
WBC # FLD AUTO: 5.27 K/UL — SIGNIFICANT CHANGE UP (ref 3.8–10.5)

## 2022-03-09 PROCEDURE — 93458 L HRT ARTERY/VENTRICLE ANGIO: CPT | Mod: 26,59

## 2022-03-09 PROCEDURE — 92978 ENDOLUMINL IVUS OCT C 1ST: CPT | Mod: 26,LD

## 2022-03-09 PROCEDURE — 99152 MOD SED SAME PHYS/QHP 5/>YRS: CPT

## 2022-03-09 PROCEDURE — 92933 PRQ TRLML C ATHRC ST ANGIOP1: CPT | Mod: LD

## 2022-03-09 PROCEDURE — 93010 ELECTROCARDIOGRAM REPORT: CPT

## 2022-03-09 RX ORDER — CLOPIDOGREL BISULFATE 75 MG/1
600 TABLET, FILM COATED ORAL ONCE
Refills: 0 | Status: COMPLETED | OUTPATIENT
Start: 2022-03-09 | End: 2022-03-09

## 2022-03-09 RX ORDER — SODIUM CHLORIDE 9 MG/ML
1000 INJECTION, SOLUTION INTRAVENOUS
Refills: 0 | Status: DISCONTINUED | OUTPATIENT
Start: 2022-03-09 | End: 2022-03-09

## 2022-03-09 RX ORDER — ATORVASTATIN CALCIUM 80 MG/1
40 TABLET, FILM COATED ORAL AT BEDTIME
Refills: 0 | Status: DISCONTINUED | OUTPATIENT
Start: 2022-03-09 | End: 2022-03-10

## 2022-03-09 RX ORDER — DEXTROSE 50 % IN WATER 50 %
12.5 SYRINGE (ML) INTRAVENOUS ONCE
Refills: 0 | Status: DISCONTINUED | OUTPATIENT
Start: 2022-03-09 | End: 2022-03-10

## 2022-03-09 RX ORDER — CLOPIDOGREL BISULFATE 75 MG/1
75 TABLET, FILM COATED ORAL DAILY
Refills: 0 | Status: DISCONTINUED | OUTPATIENT
Start: 2022-03-10 | End: 2022-03-10

## 2022-03-09 RX ORDER — ASPIRIN/CALCIUM CARB/MAGNESIUM 324 MG
81 TABLET ORAL DAILY
Refills: 0 | Status: DISCONTINUED | OUTPATIENT
Start: 2022-03-10 | End: 2022-03-10

## 2022-03-09 RX ORDER — GLUCAGON INJECTION, SOLUTION 0.5 MG/.1ML
1 INJECTION, SOLUTION SUBCUTANEOUS ONCE
Refills: 0 | Status: DISCONTINUED | OUTPATIENT
Start: 2022-03-09 | End: 2022-03-10

## 2022-03-09 RX ORDER — IBUPROFEN 200 MG
1 TABLET ORAL
Qty: 0 | Refills: 0 | DISCHARGE

## 2022-03-09 RX ORDER — INSULIN LISPRO 100/ML
VIAL (ML) SUBCUTANEOUS
Refills: 0 | Status: DISCONTINUED | OUTPATIENT
Start: 2022-03-09 | End: 2022-03-10

## 2022-03-09 RX ORDER — DEXTROSE 50 % IN WATER 50 %
25 SYRINGE (ML) INTRAVENOUS ONCE
Refills: 0 | Status: DISCONTINUED | OUTPATIENT
Start: 2022-03-09 | End: 2022-03-09

## 2022-03-09 RX ORDER — SODIUM CHLORIDE 9 MG/ML
250 INJECTION INTRAMUSCULAR; INTRAVENOUS; SUBCUTANEOUS ONCE
Refills: 0 | Status: COMPLETED | OUTPATIENT
Start: 2022-03-09 | End: 2022-03-09

## 2022-03-09 RX ORDER — SODIUM CHLORIDE 9 MG/ML
1000 INJECTION, SOLUTION INTRAVENOUS
Refills: 0 | Status: DISCONTINUED | OUTPATIENT
Start: 2022-03-09 | End: 2022-03-10

## 2022-03-09 RX ORDER — DEXTROSE 50 % IN WATER 50 %
15 SYRINGE (ML) INTRAVENOUS ONCE
Refills: 0 | Status: DISCONTINUED | OUTPATIENT
Start: 2022-03-09 | End: 2022-03-10

## 2022-03-09 RX ORDER — GLUCAGON INJECTION, SOLUTION 0.5 MG/.1ML
1 INJECTION, SOLUTION SUBCUTANEOUS ONCE
Refills: 0 | Status: DISCONTINUED | OUTPATIENT
Start: 2022-03-09 | End: 2022-03-09

## 2022-03-09 RX ORDER — LORATADINE 10 MG/1
10 TABLET ORAL ONCE
Refills: 0 | Status: COMPLETED | OUTPATIENT
Start: 2022-03-09 | End: 2022-03-09

## 2022-03-09 RX ORDER — DEXTROSE 50 % IN WATER 50 %
12.5 SYRINGE (ML) INTRAVENOUS ONCE
Refills: 0 | Status: DISCONTINUED | OUTPATIENT
Start: 2022-03-09 | End: 2022-03-09

## 2022-03-09 RX ORDER — DEXTROSE 50 % IN WATER 50 %
25 SYRINGE (ML) INTRAVENOUS ONCE
Refills: 0 | Status: DISCONTINUED | OUTPATIENT
Start: 2022-03-09 | End: 2022-03-10

## 2022-03-09 RX ORDER — CHLORHEXIDINE GLUCONATE 213 G/1000ML
1 SOLUTION TOPICAL ONCE
Refills: 0 | Status: DISCONTINUED | OUTPATIENT
Start: 2022-03-09 | End: 2022-03-10

## 2022-03-09 RX ORDER — SODIUM CHLORIDE 9 MG/ML
500 INJECTION INTRAMUSCULAR; INTRAVENOUS; SUBCUTANEOUS
Refills: 0 | Status: DISCONTINUED | OUTPATIENT
Start: 2022-03-09 | End: 2022-03-10

## 2022-03-09 RX ORDER — INSULIN LISPRO 100/ML
VIAL (ML) SUBCUTANEOUS ONCE
Refills: 0 | Status: DISCONTINUED | OUTPATIENT
Start: 2022-03-09 | End: 2022-03-09

## 2022-03-09 RX ORDER — LOSARTAN POTASSIUM 100 MG/1
100 TABLET, FILM COATED ORAL DAILY
Refills: 0 | Status: DISCONTINUED | OUTPATIENT
Start: 2022-03-10 | End: 2022-03-10

## 2022-03-09 RX ORDER — HYDROCHLOROTHIAZIDE 25 MG
25 TABLET ORAL DAILY
Refills: 0 | Status: DISCONTINUED | OUTPATIENT
Start: 2022-03-10 | End: 2022-03-10

## 2022-03-09 RX ORDER — DEXTROSE 50 % IN WATER 50 %
15 SYRINGE (ML) INTRAVENOUS ONCE
Refills: 0 | Status: DISCONTINUED | OUTPATIENT
Start: 2022-03-09 | End: 2022-03-09

## 2022-03-09 RX ORDER — TAMSULOSIN HYDROCHLORIDE 0.4 MG/1
0.4 CAPSULE ORAL AT BEDTIME
Refills: 0 | Status: DISCONTINUED | OUTPATIENT
Start: 2022-03-09 | End: 2022-03-10

## 2022-03-09 RX ORDER — PANTOPRAZOLE SODIUM 20 MG/1
40 TABLET, DELAYED RELEASE ORAL
Refills: 0 | Status: DISCONTINUED | OUTPATIENT
Start: 2022-03-09 | End: 2022-03-10

## 2022-03-09 RX ORDER — METOPROLOL TARTRATE 50 MG
100 TABLET ORAL DAILY
Refills: 0 | Status: DISCONTINUED | OUTPATIENT
Start: 2022-03-09 | End: 2022-03-10

## 2022-03-09 RX ADMIN — SODIUM CHLORIDE 125 MILLILITER(S): 9 INJECTION INTRAMUSCULAR; INTRAVENOUS; SUBCUTANEOUS at 08:12

## 2022-03-09 RX ADMIN — LORATADINE 10 MILLIGRAM(S): 10 TABLET ORAL at 18:41

## 2022-03-09 RX ADMIN — ATORVASTATIN CALCIUM 40 MILLIGRAM(S): 80 TABLET, FILM COATED ORAL at 21:46

## 2022-03-09 RX ADMIN — PANTOPRAZOLE SODIUM 40 MILLIGRAM(S): 20 TABLET, DELAYED RELEASE ORAL at 12:02

## 2022-03-09 RX ADMIN — TAMSULOSIN HYDROCHLORIDE 0.4 MILLIGRAM(S): 0.4 CAPSULE ORAL at 21:46

## 2022-03-09 RX ADMIN — CLOPIDOGREL BISULFATE 600 MILLIGRAM(S): 75 TABLET, FILM COATED ORAL at 08:13

## 2022-03-09 RX ADMIN — SODIUM CHLORIDE 100 MILLILITER(S): 9 INJECTION INTRAMUSCULAR; INTRAVENOUS; SUBCUTANEOUS at 10:30

## 2022-03-09 NOTE — PATIENT PROFILE ADULT - FALL HARM RISK - HARM RISK INTERVENTIONS

## 2022-03-09 NOTE — DISCHARGE NOTE PROVIDER - HOSPITAL COURSE
78 y/o male w/ FHx MI (father  at age 55) and PMHx HTN, HLD, type II DM, and BPH who presented to outpatient cardiologist, Dr. Hairston, endorsing an episode of LIN while walking to the store approximately one month ago that self-resolved after about a half an hour. Patient reports he has not had any additional episodes; however, patient does admit to occasional pinching pain in the left upper chest region occurring every couple of weeks, experienced w/ exertion and at rest, and lasting about 3-5 minutes. Patient reports that he is able to walk 1.5 hours to 2 hours daily. Patient denies any dizziness, syncope, palpitations, abdominal pain, nausea/vomiting, melena, LE edema, fever, chills, cough. Echocardiogram (2022) showed EF 55-60%, mild mitral annular calcification, trace MR, trivial TR, normal trileaflet aortic valve, and no evidence of pulmonary hypertension. CCTA (2022) revealed Ca score 1125 Agatston units, proximal LAD 50-70%, proximal RCA 70-90%. In light of patient's risk factors, CCS Class IV anginal symptoms, and abnormal CCTA results, patient now presents for cardiac catheterization w/ possible intervention if clinically indicated. s/p cardiac cath 3/9/2022: CSI/ELIO x 1 pLAD (90%); dLM 30%, oLAD 30%, Mlad 40%, D1 mild diffuse, pLCX 30%, OM1 30%, OM2 mild diffuse, OM3 subtotal, pRCA 80%, dRCA 60% diffuse small vessel. EF 55-60%, EDP 7 mmHg. Radial site stable without hematoma, distal pulse intact. Plan for staged PCI of RCA in 5 weeks. Pt admitted to [facility] overnight for observation, VSS, labs checked, no events overnight, no complaints on day of dc. Pt will continue Aspirin 81mg QD, Plavix 75mg QD, fenofibric acid 135 mg qhs, losartan-HCTZ 100-25 mg QD, toprol 100 mg QD. Home simvastatin 40 mg Qhs increased to atorvastatin 40 mg QHS.  Pt given appropriate d/c instructions and verbalized understanding. Medications sent to preferred pharmacy. Pt deemed stable for d/c per Dr. Carr and will f/u with Dr. Hairston in 1-2 weeks.    Cardiac Rehab (STEMI/NSTEMI/ACS/Unstable Angina/CHF/Chronic Stable Angina/Heart Surgery (CABG,Valve)/Post PCI):              *Education on benefits of Cardiac Rehab provided to patient: Yes         *Referral and Prescription Given for Cardiac Rehab : Yes         *Pt given list of locations & instructed to contact their insurance company to review list of participating providers          *Pt instructed to bring Cardiac Rehab prescription with them to Cardiology Follow up appointment for assistance with enrollment: Yes

## 2022-03-09 NOTE — DISCHARGE NOTE PROVIDER - CARE PROVIDER_API CALL
Prashanth Hairston)  Cardiovascular Disease; Internal Medicine; Interventional Cardiology  [address] [place] [address], [address] [place] [address]  Phone: [phone]  Fax: [phone]  Established Patient  Follow Up Time:

## 2022-03-09 NOTE — DISCHARGE NOTE PROVIDER - NSDCFUSCHEDAPPT_GEN_ALL_CORE_FT
HUONG MARIEE ; 04/29/2022 ; \[facility] [place] [facility]\"" [facility] [address] [facility]  HUONG MARIEE ; 05/06/2022 ; \[facility] [place] [facility]\"" Intmed [address]

## 2022-03-09 NOTE — DISCHARGE NOTE PROVIDER - NSDCMRMEDTOKEN_GEN_ALL_CORE_FT
aspirin 81 mg oral delayed release tablet: 1 tab(s) orally once a day  fenofibric acid 135 mg oral delayed release capsule: 1 cap(s) orally once a day  losartan-hydrochlorothiazide 100 mg-25 mg oral tablet: 1 tab(s) orally once a day  metFORMIN 500 mg oral tablet: 1 tab(s) orally 2 times a day  nitroglycerin 0.4 mg sublingual tablet: 1 tab(s) sublingual every 5 minutes, As Needed  simvastatin 40 mg oral tablet: 1 tab(s) orally once a day (at bedtime)  tamsulosin 0.4 mg oral capsule: 1 cap(s) orally once a day  Toprol- mg oral tablet, extended release: 1 tab(s) orally once a day  Vitamin D3 50 mcg (2000 intl units) oral tablet: 1 tab(s) orally once a day   aspirin 81 mg oral delayed release tablet: 1 tab(s) orally once a day  atorvastatin 40 mg oral tablet: 1 tab(s) orally once a day (at bedtime)  clopidogrel 75 mg oral tablet: 1 tab(s) orally once a day   fenofibric acid 135 mg oral delayed release capsule: 1 cap(s) orally once a day  losartan-hydrochlorothiazide 100 mg-25 mg oral tablet: 1 tab(s) orally once a day  metFORMIN 500 mg oral tablet: 1 tab(s) orally 2 times a day  nitroglycerin 0.4 mg sublingual tablet: 1 tab(s) sublingual every 5 minutes, As Needed  tamsulosin 0.4 mg oral capsule: 1 cap(s) orally once a day  Toprol- mg oral tablet, extended release: 1 tab(s) orally once a day  Vitamin D3 50 mcg (2000 intl units) oral tablet: 1 tab(s) orally once a day

## 2022-03-09 NOTE — DISCHARGE NOTE PROVIDER - NSDCCPCAREPLAN_GEN_ALL_CORE_FT
PRINCIPAL DISCHARGE DIAGNOSIS  Diagnosis: CAD (coronary artery disease)  Assessment and Plan of Treatment: You have a diagnosis of coronary artery disease and underwent a cardiac catheterization. You received a stent to your left anterior descending coronary artery.  You have been started on Aspirin 81mg daily and Plavix (Clopidogrel) 75mg daily. The procedure was done through the wrist. Please avoid any heavy lifting  (no more than 3 to 5 lbs) or strenuous activity for five days. If you develop any swelling, bleeding, hardening of the skin (hematoma formation), acute pain, numbness/tingling  in your arm please contact your doctor immediately or call our 24/7 line: [phone].   NEVER MISS A DOSE OF ASPIRIN OR PLAVIX; IF YOU DO, YOU ARE AT RISK OF YOUR STENT CLOSING AND HAVING A HEART ATTACK. DO NOT STOP THESE TWO MEDICATIONS UNLESS INSTRUCTED TO DO SO BY YOUR CARDIOLOGIST.    Please make a follow up appointment with your cardiologist Dr Hairston within 1-2 weeks of your discharge. There is a remaining blockage that you will need to return for a stent. All of your prescriptions have been sent electronically to your pharmacy.  We have provided you with a prescription for cardiac rehab which is medically supervised exercise program for your heart and has been shown to improve the quantity and quality of life of people with heart disease like yours. You should attend cardiac rehab 3 times per week for 12 weeks. We have provided you with a list of nearby facilities. Please call your insurance carrier to determine which of these facilities are covered under your plan. Please bring this prescription with you to your follow up appointment with your cardiologist who can then further assist you to enroll into a cardiac rehab program.        SECONDARY DISCHARGE DIAGNOSES  Diagnosis: HLD (hyperlipidemia)  Assessment and Plan of Treatment: Please STOP simvastatin 40 mg daily and START Atorvastatin 40 mg daily.    Diagnosis: Type 2 diabetes mellitus  Assessment and Plan of Treatment: If you are a diabetic and you take Metformin: DO NOT TAKE your Metformin for two days. This medication can interact with the contrast used during your procedure therefore we want to ensure the contrast has left your body prior to you restarting your Metformin.   Make sure you monitor your finger sticks and diet while you are not taking your Metformin!  PLEASE HOLD AND RESTART METFORMIN ON 3/12/2022.

## 2022-03-09 NOTE — DISCHARGE NOTE PROVIDER - NSDCFUADDAPPT_GEN_ALL_CORE_FT
Our [facility] Cardiovascular Prevention Team from [facility] will contact to you to arrange an outpatient office visit (telehealth or in person). The goal of this service is to optimize medication (blood pressure, cholesterol, diabetes) and lifestyle (diet, exercise, weight management, smoking) regimens to help lower the risk of cardiovascular events. They will work with your cardiologist to help support and guide you after your hospitalization.

## 2022-03-10 ENCOUNTER — TRANSCRIPTION ENCOUNTER (OUTPATIENT)
Age: 77
End: 2022-03-10

## 2022-03-10 VITALS — TEMPERATURE: 97 F

## 2022-03-10 PROBLEM — E78.5 HYPERLIPIDEMIA, UNSPECIFIED: Chronic | Status: ACTIVE | Noted: 2022-03-08

## 2022-03-10 PROBLEM — I10 ESSENTIAL (PRIMARY) HYPERTENSION: Chronic | Status: ACTIVE | Noted: 2022-03-08

## 2022-03-10 PROBLEM — E11.9 TYPE 2 DIABETES MELLITUS WITHOUT COMPLICATIONS: Chronic | Status: ACTIVE | Noted: 2022-03-08

## 2022-03-10 PROBLEM — N40.0 BENIGN PROSTATIC HYPERPLASIA WITHOUT LOWER URINARY TRACT SYMPTOMS: Chronic | Status: ACTIVE | Noted: 2022-03-08

## 2022-03-10 LAB
ANION GAP SERPL CALC-SCNC: 9 MMOL/L — SIGNIFICANT CHANGE UP (ref 5–17)
BUN SERPL-MCNC: 25 MG/DL — HIGH (ref 7–23)
CALCIUM SERPL-MCNC: 9.1 MG/DL — SIGNIFICANT CHANGE UP (ref 8.4–10.5)
CHLORIDE SERPL-SCNC: 103 MMOL/L — SIGNIFICANT CHANGE UP (ref 96–108)
CO2 SERPL-SCNC: 25 MMOL/L — SIGNIFICANT CHANGE UP (ref 22–31)
CREAT SERPL-MCNC: 1.03 MG/DL — SIGNIFICANT CHANGE UP (ref 0.5–1.3)
EGFR: 76 ML/MIN/1.73M2 — SIGNIFICANT CHANGE UP
GLUCOSE BLDC GLUCOMTR-MCNC: 135 MG/DL — HIGH (ref 70–99)
GLUCOSE SERPL-MCNC: 125 MG/DL — HIGH (ref 70–99)
HCT VFR BLD CALC: 39.3 % — SIGNIFICANT CHANGE UP (ref 39–50)
HCV AB S/CO SERPL IA: 0.04 S/CO — SIGNIFICANT CHANGE UP
HCV AB SERPL-IMP: SIGNIFICANT CHANGE UP
HGB BLD-MCNC: 12.6 G/DL — LOW (ref 13–17)
MAGNESIUM SERPL-MCNC: 1.9 MG/DL — SIGNIFICANT CHANGE UP (ref 1.6–2.6)
MCHC RBC-ENTMCNC: 30.6 PG — SIGNIFICANT CHANGE UP (ref 27–34)
MCHC RBC-ENTMCNC: 32.1 GM/DL — SIGNIFICANT CHANGE UP (ref 32–36)
MCV RBC AUTO: 95.4 FL — SIGNIFICANT CHANGE UP (ref 80–100)
NRBC # BLD: 0 /100 WBCS — SIGNIFICANT CHANGE UP (ref 0–0)
PLATELET # BLD AUTO: 250 K/UL — SIGNIFICANT CHANGE UP (ref 150–400)
POTASSIUM SERPL-MCNC: 3.9 MMOL/L — SIGNIFICANT CHANGE UP (ref 3.5–5.3)
POTASSIUM SERPL-SCNC: 3.9 MMOL/L — SIGNIFICANT CHANGE UP (ref 3.5–5.3)
RBC # BLD: 4.12 M/UL — LOW (ref 4.2–5.8)
RBC # FLD: 13.3 % — SIGNIFICANT CHANGE UP (ref 10.3–14.5)
SODIUM SERPL-SCNC: 137 MMOL/L — SIGNIFICANT CHANGE UP (ref 135–145)
WBC # BLD: 5.64 K/UL — SIGNIFICANT CHANGE UP (ref 3.8–10.5)
WBC # FLD AUTO: 5.64 K/UL — SIGNIFICANT CHANGE UP (ref 3.8–10.5)

## 2022-03-10 PROCEDURE — 83735 ASSAY OF MAGNESIUM: CPT

## 2022-03-10 PROCEDURE — 85025 COMPLETE CBC W/AUTO DIFF WBC: CPT

## 2022-03-10 PROCEDURE — 80048 BASIC METABOLIC PNL TOTAL CA: CPT

## 2022-03-10 PROCEDURE — 85730 THROMBOPLASTIN TIME PARTIAL: CPT

## 2022-03-10 PROCEDURE — 82553 CREATINE MB FRACTION: CPT

## 2022-03-10 PROCEDURE — 82550 ASSAY OF CK (CPK): CPT

## 2022-03-10 PROCEDURE — C1887: CPT

## 2022-03-10 PROCEDURE — 82962 GLUCOSE BLOOD TEST: CPT

## 2022-03-10 PROCEDURE — 85027 COMPLETE CBC AUTOMATED: CPT

## 2022-03-10 PROCEDURE — C1874: CPT

## 2022-03-10 PROCEDURE — C1753: CPT

## 2022-03-10 PROCEDURE — 99239 HOSP IP/OBS DSCHRG MGMT >30: CPT

## 2022-03-10 PROCEDURE — 93005 ELECTROCARDIOGRAM TRACING: CPT

## 2022-03-10 PROCEDURE — C1769: CPT

## 2022-03-10 PROCEDURE — C1725: CPT

## 2022-03-10 PROCEDURE — C1724: CPT

## 2022-03-10 PROCEDURE — 85610 PROTHROMBIN TIME: CPT

## 2022-03-10 PROCEDURE — C1894: CPT

## 2022-03-10 PROCEDURE — 80061 LIPID PANEL: CPT

## 2022-03-10 PROCEDURE — 86803 HEPATITIS C AB TEST: CPT

## 2022-03-10 PROCEDURE — 36415 COLL VENOUS BLD VENIPUNCTURE: CPT

## 2022-03-10 PROCEDURE — 80053 COMPREHEN METABOLIC PANEL: CPT

## 2022-03-10 PROCEDURE — 82565 ASSAY OF CREATININE: CPT

## 2022-03-10 PROCEDURE — 83036 HEMOGLOBIN GLYCOSYLATED A1C: CPT

## 2022-03-10 RX ORDER — ATORVASTATIN CALCIUM 80 MG/1
1 TABLET, FILM COATED ORAL
Qty: 30 | Refills: 2
Start: 2022-03-10 | End: 2022-06-07

## 2022-03-10 RX ORDER — CLOPIDOGREL BISULFATE 75 MG/1
1 TABLET, FILM COATED ORAL
Qty: 30 | Refills: 11
Start: 2022-03-10 | End: 2023-03-04

## 2022-03-10 RX ORDER — POTASSIUM CHLORIDE 20 MEQ
10 PACKET (EA) ORAL ONCE
Refills: 0 | Status: COMPLETED | OUTPATIENT
Start: 2022-03-10 | End: 2022-03-10

## 2022-03-10 RX ORDER — SIMVASTATIN 20 MG/1
1 TABLET, FILM COATED ORAL
Qty: 0 | Refills: 0 | DISCHARGE

## 2022-03-10 RX ADMIN — Medication 10 MILLIEQUIVALENT(S): at 11:27

## 2022-03-10 RX ADMIN — PANTOPRAZOLE SODIUM 40 MILLIGRAM(S): 20 TABLET, DELAYED RELEASE ORAL at 06:33

## 2022-03-10 RX ADMIN — CLOPIDOGREL BISULFATE 75 MILLIGRAM(S): 75 TABLET, FILM COATED ORAL at 11:28

## 2022-03-10 RX ADMIN — Medication 81 MILLIGRAM(S): at 11:27

## 2022-03-10 NOTE — DISCHARGE NOTE NURSING/CASE MANAGEMENT/SOCIAL WORK - NSDCFUADDAPPT_GEN_ALL_CORE_FT
Our [facility] Cardiovascular Prevention Team from [facility] will contact to you to arrange an outpatient office visit (telehealth or in person). The goal of this service is to optimize medication (blood pressure, cholesterol, diabetes) and lifestyle (diet, exercise, weight management, smoking) regimens to help lower the risk of cardiovascular events. They will work with your cardiologist to help support and guide you after your hospitalization.

## 2022-03-10 NOTE — DISCHARGE NOTE NURSING/CASE MANAGEMENT/SOCIAL WORK - NSDCPEFALRISK_GEN_ALL_CORE
For information on Fall & Injury Prevention, visit: [email or web address] OR  [email or web address] OR  https://www.cdc.gov/steadi/patient.html

## 2022-03-10 NOTE — DISCHARGE NOTE NURSING/CASE MANAGEMENT/SOCIAL WORK - PATIENT PORTAL LINK FT
You can access the FollowMyHealth Patient Portal offered by [facility] by registering at the following website: [email or web address]. By joining [employer]’s FollowMyHealth portal, you will also be able to view your health information using other applications (apps) compatible with our system.

## 2022-03-15 DIAGNOSIS — I10 ESSENTIAL (PRIMARY) HYPERTENSION: ICD-10-CM

## 2022-03-15 DIAGNOSIS — Z79.82 LONG TERM (CURRENT) USE OF ASPIRIN: ICD-10-CM

## 2022-03-15 DIAGNOSIS — Z82.49 FAMILY HISTORY OF ISCHEMIC HEART DISEASE AND OTHER DISEASES OF THE CIRCULATORY SYSTEM: ICD-10-CM

## 2022-03-15 DIAGNOSIS — E78.5 HYPERLIPIDEMIA, UNSPECIFIED: ICD-10-CM

## 2022-03-15 DIAGNOSIS — I25.84 CORONARY ATHEROSCLEROSIS DUE TO CALCIFIED CORONARY LESION: ICD-10-CM

## 2022-03-15 DIAGNOSIS — E11.9 TYPE 2 DIABETES MELLITUS WITHOUT COMPLICATIONS: ICD-10-CM

## 2022-03-15 DIAGNOSIS — N40.0 BENIGN PROSTATIC HYPERPLASIA WITHOUT LOWER URINARY TRACT SYMPTOMS: ICD-10-CM

## 2022-03-15 DIAGNOSIS — Z79.84 LONG TERM (CURRENT) USE OF ORAL HYPOGLYCEMIC DRUGS: ICD-10-CM

## 2022-03-15 DIAGNOSIS — I25.10 ATHEROSCLEROTIC HEART DISEASE OF NATIVE CORONARY ARTERY WITHOUT ANGINA PECTORIS: ICD-10-CM

## 2022-03-29 ENCOUNTER — APPOINTMENT (OUTPATIENT)
Dept: HEART AND VASCULAR | Facility: CLINIC | Age: 77
End: 2022-03-29
Payer: MEDICARE

## 2022-03-29 VITALS
TEMPERATURE: 98 F | BODY MASS INDEX: 27.49 KG/M2 | WEIGHT: 165 LBS | DIASTOLIC BLOOD PRESSURE: 56 MMHG | OXYGEN SATURATION: 96 % | HEIGHT: 65 IN | HEART RATE: 58 BPM | SYSTOLIC BLOOD PRESSURE: 136 MMHG

## 2022-03-29 PROCEDURE — 99215 OFFICE O/P EST HI 40 MIN: CPT

## 2022-03-29 NOTE — ASSESSMENT
[FreeTextEntry1] : 78 yo M with HTN DM, HLD, now s/p PCI with ELIO to pLAD on 3/9/2022  here for follow up\par \par CAD s/p PCI of LAD\par -Scheduled for staged PCI of RCA on 4/22/2022 at [facility]\par -echocardiogram is WNL\par -ASA 81mg daily indefinitely\par -Plavix 75mg daily for at least 6 months\par -Atorvastatin 40mg daily\par -Toprol 100 mg daily\par -recommend to call clinic immediately if onset of chest pain\par \par HTN\par -well controlled today \par -Continue  losartan-hctz  100-25 mg daily \par -Recommended to call the clinic if blood pressure is > 140/90\par -cont with metoprolol 100 mg daily \par -Cr 1.18 2/1/2022\par \par HLD\par -Continue with atorvastatin 40 mg daily\par -Recommended healthy diet/exercise\par \par f/u post PCI in clinic on 4/26/2022

## 2022-03-29 NOTE — HISTORY OF PRESENT ILLNESS
[FreeTextEntry1] : 76 yo M with HTN DM, HLD, now s/p PCI with ELIO to pLAD on 3/9/2022 and residual RCA , planned for staged PCI here for follow up\par The patient reports a significant improvement of his dyspnea on exertion after PCI of his LAD.\par Denies Syncope/presyncope/palpitations/LE edema\par Reports compliance with his medications\par \par PMH \par as above\par \par ALL\par NKDA\par \par Discharge Medications \par aspirin 81 mg daily\par atorvastatin 40 mg daily\par clopidogrel 75 mg daily\par Toprol  mg daily\par fenofibric acid 135 mg oral daily\par losartan-hydrochlorothiazide 100 mg-25 mg daily\par metFORMIN 500 mg twice daily\par nitroglycerin 0.4 mg sublingual tablet: 1 tab(s) sublingual every 5 minutes, As Needed\par tamsulosin 0.4 mg\par Vitamin D3 50 mcg (2000 intl units) dailly\par \par FH\par father  at age 55 of MI\par \par SH\par no smoke, 1 bottle of wine/day, occasional whisky, no drugs\par \par EKG 2022 SR, WNL, HR 58 BPM\par EKG 2020 SR, wnl\par \par \par Cardiac Cath 3/9/2022 \par Successful PCI of proximal LAD with ELIO x1 (3.5 x 38) after orbital atherectomy\par LM Distal : There is a 30 % stenosis. Intravascular ultrasound was performed. Imaging shows minimal cross sectional area of 8.9 mm2. Based on the results of this study, the lesion was judged to be non-significant and no intervention was performed.\par LAD: Ostial 30%, followed by 90% severe stenosis with severe calcification in the prox and the 40% stenosis with systolic knuckling in the mid. First diagonal: Mild diffuse disease.\par CX  Proximal circumflex: Mild diffuse disease. There is a 30 % stenosis. First obtuse marginal: The segment is large. Mild diffuse disease. There is a 40 % stenosis in the proximal third portion of the segment. Second obtuse marginal: Mild diffuse disease. Third obtuse marginal: Subtotaled, with left to left collaterals.  \par RCA  Right coronary artery: Prox 80% moderately calcified, distally there is 60% diffuse stenosis and is a small vessel. Right posterior descending artery: Mild diffuse disease.  \par LVDEP  7 mmHg.\par \par Successful PCI of proximal LAD with ELIO x1 (3.5 x 38) after orbital atherectomy\par \par cCTA 3/1/2022\par -Right dominant coronary arteries with obstructive plaque in the pLAD resulting in up to 50-70% stenosis and pRCA with up to 70-90% stenosis. \par -Extensive burden of calcium, with a score of 1125.2\par -Normal cardiac cardiac structural morphology\par -Normal imaged thoracic aorta, pulmonary vasculature, and systemic veins\par \par Echo 2022\par LVEF 55-60%\par Normal left ventricular size and wall thickness, with normal systolic and diastolic function\par Mild annular calcification\par Normal trileaflet aortic valve with normal opening. \par No evidence of pHTN\par Technically difficulty study\par \par Echocardiogram 2020\par Left ventricular ejection fraction is 60-65%\par Mild MR\par \par Exercise stress test \par The patient exercised for 7 minutes 58 seconds on a Neville protocol (94% of MPHR)\par EKG with 2 mm ST depression upsloping in V5 and V6 at peak exercise\par The study was stopped due to fatigue, No chest pain no arrhythmias during exercise or recovery\par \par Labs 2022\par Cr 1.18\par Tri 145\par Chol 179\par HDL 48\par \par \par 2020\par Cr 1.24 \par \par \par \par

## 2022-04-15 VITALS
HEIGHT: 65 IN | SYSTOLIC BLOOD PRESSURE: 145 MMHG | WEIGHT: 164.91 LBS | DIASTOLIC BLOOD PRESSURE: 65 MMHG | RESPIRATION RATE: 18 BRPM | HEART RATE: 51 BPM | TEMPERATURE: 97 F | OXYGEN SATURATION: 92 %

## 2022-04-15 NOTE — H&P ADULT - HISTORY OF PRESENT ILLNESS
Covid:   Cardiologist: Dr. Hairston   Pharmacy: [facility] (in system)  Escort:      SKELETON-Confirm Symptoms and meds-staged PCI    76 y/o male w/ FHx MI (father  at age 55) and PMHx HTN, HLD, DM Type II, BPH, CAD s/p PCI CSI/ELIO x 1 pLAD at [facility] 3/9/22 with residual RCA disease who returns for staged PCI. Since PCI patient reports LIN is improved.         Patient originally presented with LIN and left sided chest pain with exertion as well as at rest. Subsequent CCTA (2022) revealed Ca score 1125 Agatston units, proximal LAD 50-70%, proximal RCA 70-90%.  Patient underwent successful ELIO and Atherectomy of LAD and was discharged on 3/10/22 without issues.       Echocardiogram (2022) showed EF 55-60%, mild mitral annular calcification, trace MR, trivial TR, normal trileaflet aortic valve, and no evidence of pulmonary hypertension      Cardiac Cath 3/9/22 at [facility]: CSI/ELIO x 1 pLAD (90%); dLM 30%, oLAD 30%, Mlad 40%, D1 mild diffuse, pLCX 30%, OM1 30%, OM2 mild diffuse, OM3 subtotal, pRCA 80%, dRCA 60% diffuse small vessel. EF 55-60%, EDP 7 mmHg.   Covid: 22- Neg in HIE  Cardiologist: Dr. Hairston   [facility]  Escort: Family       76 y/o male w/ FHx MI (father  at age 55) and PMHx HTN, HLD, DM Type II, BPH, CAD s/p PCI CSI/ELIO x 1 pLAD at [facility] 3/9/22 with residual RCA disease who returns for staged PCI. Since PCI patient reports LIN is improved.      Patient originally presented with LIN and left sided chest pain with exertion as well as at rest. Subsequent CCTA (2022) revealed Ca score 1125 Agatston units, proximal LAD 50-70%, proximal RCA 70-90%. Patient underwent successful ELIO and Atherectomy of LAD and was discharged on 3/10/22 without issues. Echocardiogram (2022) showed EF 55-60%, mild mitral annular calcification, trace MR, trivial TR, normal trileaflet aortic valve, and no evidence of pulmonary hypertension. In light of pt's risk factors, CCS class __ anginal symptoms and known residual disease pt is referred to [facility] for staged PCI.         Cardiac Cath 3/9/22 at [facility]: CSI/ELIO x 1 pLAD (90%); dLM 30%, oLAD 30%, Mlad 40%, D1 mild diffuse, pLCX 30%, OM1 30%, OM2 mild diffuse, OM3 subtotal, pRCA 80%, dRCA 60% diffuse small vessel. EF 55-60%, EDP 7 mmHg.   Covid: 22- Neg in HIE  Cardiologist: Dr. Hairston   [facility]  Escort: Family       78 y/o male w/ FHx MI (father  at age 55) and PMHx HTN, HLD, DM Type II, BPH, CAD s/p PCI CSI/ELIO x 1 pLAD at [facility] 3/9/22 with residual RCA disease who returns for staged PCI. Since PCI patient reports LIN is improved. Pt currently denies fever, chills, cough, CP, palpitations, LIN, PND/orthopnea, LE edema, abdominal pain, N/V/D, dizziness, syncope. Patient originally presented with LIN and left sided chest pain with exertion as well as at rest. Subsequent CCTA (2022) revealed Ca score 1125 Agatston units, proximal LAD 50-70%, proximal RCA 70-90%. Patient underwent successful ELIO and Atherectomy of LAD and was discharged on 3/10/22 without issues. Echocardiogram (2022) showed EF 55-60%, mild mitral annular calcification, trace MR, trivial TR, normal trileaflet aortic valve, and no evidence of pulmonary hypertension. In light of pt's risk factors and known residual disease pt is referred to [facility] for staged PCI.         Cardiac Cath 3/9/22 at [facility]: CSI/ELIO x 1 pLAD (90%); dLM 30%, oLAD 30%, Mlad 40%, D1 mild diffuse, pLCX 30%, OM1 30%, OM2 mild diffuse, OM3 subtotal, pRCA 80%, dRCA 60% diffuse small vessel. EF 55-60%, EDP 7 mmHg.   Covid: 22- Neg in HIE  Cardiologist: Dr. Hairston   Pharmacy: [facility] (in system)  Escort: Family     Medications confirmed with pt verbally, cross referenced with d/c note and surescripts.     78 y/o male w/ FHx MI (father  at age 55) and PMHx HTN, HLD, DM Type II, BPH, CAD s/p PCI CSI/ELIO x 1 pLAD at [facility] 3/9/22 with residual RCA disease who returns for staged PCI. Since PCI patient reports LIN is improved. Pt currently denies fever, chills, cough, CP, palpitations, LIN, PND/orthopnea, LE edema, abdominal pain, N/V/D, dizziness, syncope. Patient originally presented with LIN and left sided chest pain with exertion as well as at rest. Subsequent CCTA (2022) revealed Ca score 1125 Agatston units, proximal LAD 50-70%, proximal RCA 70-90%. Patient underwent successful ELIO and Atherectomy of LAD and was discharged on 3/10/22 without issues. Echocardiogram (2022) showed EF 55-60%, mild mitral annular calcification, trace MR, trivial TR, normal trileaflet aortic valve, and no evidence of pulmonary hypertension. In light of pt's risk factors and known residual disease pt is referred to [facility] for staged PCI.         Cardiac Cath 3/9/22 at [facility]: CSI/ELIO x 1 pLAD (90%); dLM 30%, oLAD 30%, Mlad 40%, D1 mild diffuse, pLCX 30%, OM1 30%, OM2 mild diffuse, OM3 subtotal, pRCA 80%, dRCA 60% diffuse small vessel. EF 55-60%, EDP 7 mmHg.

## 2022-04-15 NOTE — H&P ADULT - NSHPLABSRESULTS_GEN_ALL_CORE
PT/INR - ( 22 Apr 2022 07:49 )   PT: 11.1 sec;   INR: 0.93          PTT - ( 22 Apr 2022 07:49 )  PTT:32.9 sec ECG: SB @ 51bpm, TWI in III                        14.3   5.01  )-----------( 315      ( 22 Apr 2022 08:23 )             43.7       04-22    141  |  103  |  22  ----------------------------<  137<H>  4.6   |  26  |  1.00    Ca    9.6      22 Apr 2022 08:04    TPro  7.2  /  Alb  4.6  /  TBili  0.5  /  DBili  x   /  AST  35  /  ALT  29  /  AlkPhos  38<L>  04-22      PT/INR - ( 22 Apr 2022 07:49 )   PT: 11.1 sec;   INR: 0.93          PTT - ( 22 Apr 2022 07:49 )  PTT:32.9 sec    CARDIAC MARKERS ( 22 Apr 2022 08:04 )  x     / x     / 54 U/L / x     / 2.2 ng/mL

## 2022-04-15 NOTE — H&P ADULT - ASSESSMENT
76 y/o male w/ FHx MI (father  at age 55) and PMHx HTN, HLD, DM Type II, BPH, CAD s/p PCI CSI/ELIO x 1 pLAD at [facility] 3/9/22 with residual RCA disease who in light of pt's risk factors, CCS class __ anginal symptoms and known residual disease pt is referred to [facility] for staged PCI.     ASA ___, Mallampati ___    Hgb/HCT:   NS @ ___. EF 55-60% by ECHO. Pt is euvolemic on exam. Cr. ___    Pt is a candidate for moderate sedation: Yes    Risks & benefits of procedure and alternative therapy have been explained to the patient including but not limited to: allergic reaction, bleeding w/possible need for blood transfusion, infection, renal and vascular compromise, limb damage, arrhythmia, stroke, vessel dissection/perforation, Myocardial infarction, emergent CABG. Informed consent obtained and in chart.   78 y/o male w/ FHx MI (father  at age 55) and PMHx HTN, HLD, DM Type II, BPH, CAD s/p PCI CSI/ELIO x 1 pLAD at [facility] 3/9/22 with residual RCA disease who in light of pt's risk factors and known residual disease pt is referred to [facility] for staged PCI.     ASA II, Mallampati II    Hgb/HCT: 14.6/44.9 by iSTAT. No bleeding, melena, hematuria, BRBPR. Pt reports compliance with daily Aspirin and Plavix, last dose 22. Pt orderedf for home doses.    cc bolus followed b 75 cc/hr ordered. EF 55-60% by ECHO. Pt is euvolemic on exam. Cr. 1.0 by iSTAT.    Pt is a candidate for moderate sedation: Yes    Risks & benefits of procedure and alternative therapy have been explained to the patient including but not limited to: allergic reaction, bleeding w/possible need for blood transfusion, infection, renal and vascular compromise, limb damage, arrhythmia, stroke, vessel dissection/perforation, Myocardial infarction, emergent CABG. Informed consent obtained and in chart.   76 y/o male w/ FHx MI (father  at age 55) and PMHx HTN, HLD, DM Type II, BPH, CAD s/p PCI CSI/ELIO x 1 pLAD at [facility] 3/9/22 with residual RCA disease who in light of pt's risk factors and known residual disease pt is referred to [facility] for staged PCI.     ASA II, Mallampati II    Hgb/HCT: 14.3/43.7. No bleeding, melena, hematuria, BRBPR. Pt reports compliance with daily Aspirin and Plavix, last dose 22. Pt ordered for home doses.    cc bolus followed b 75 cc/hr ordered. EF 55-60% by ECHO. Pt is euvolemic on exam. Cr. 1.00.    Pt is a candidate for moderate sedation: Yes    Risks & benefits of procedure and alternative therapy have been explained to the patient including but not limited to: allergic reaction, bleeding w/possible need for blood transfusion, infection, renal and vascular compromise, limb damage, arrhythmia, stroke, vessel dissection/perforation, Myocardial infarction, emergent CABG. Informed consent obtained and in chart.

## 2022-04-19 ENCOUNTER — APPOINTMENT (OUTPATIENT)
Dept: HEART AND VASCULAR | Facility: CLINIC | Age: 77
End: 2022-04-19
Payer: MEDICARE

## 2022-04-19 PROCEDURE — ZZZZZ: CPT

## 2022-04-22 ENCOUNTER — INPATIENT (INPATIENT)
Facility: HOSPITAL | Age: 77
LOS: 0 days | Discharge: HOME | DRG: 247 | End: 2022-04-23
Attending: INTERNAL MEDICINE | Admitting: INTERNAL MEDICINE
Payer: COMMERCIAL

## 2022-04-22 ENCOUNTER — TRANSCRIPTION ENCOUNTER (OUTPATIENT)
Age: 77
End: 2022-04-22

## 2022-04-22 LAB
A1C WITH ESTIMATED AVERAGE GLUCOSE RESULT: 5.9 % — HIGH (ref 4–5.6)
ALBUMIN SERPL ELPH-MCNC: 4.6 G/DL — SIGNIFICANT CHANGE UP (ref 3.3–5)
ALP SERPL-CCNC: 38 U/L — LOW (ref 40–120)
ALT FLD-CCNC: 29 U/L — SIGNIFICANT CHANGE UP (ref 10–45)
ANION GAP SERPL CALC-SCNC: 12 MMOL/L — SIGNIFICANT CHANGE UP (ref 5–17)
APTT BLD: 32.9 SEC — SIGNIFICANT CHANGE UP (ref 27.5–35.5)
AST SERPL-CCNC: 35 U/L — SIGNIFICANT CHANGE UP (ref 10–40)
BASOPHILS # BLD AUTO: 0.09 K/UL — SIGNIFICANT CHANGE UP (ref 0–0.2)
BASOPHILS NFR BLD AUTO: 1.8 % — SIGNIFICANT CHANGE UP (ref 0–2)
BILIRUB SERPL-MCNC: 0.5 MG/DL — SIGNIFICANT CHANGE UP (ref 0.2–1.2)
BUN SERPL-MCNC: 22 MG/DL — SIGNIFICANT CHANGE UP (ref 7–23)
CALCIUM SERPL-MCNC: 9.6 MG/DL — SIGNIFICANT CHANGE UP (ref 8.4–10.5)
CHLORIDE SERPL-SCNC: 103 MMOL/L — SIGNIFICANT CHANGE UP (ref 96–108)
CHOLEST SERPL-MCNC: 151 MG/DL — SIGNIFICANT CHANGE UP
CK MB CFR SERPL CALC: 2.2 NG/ML — SIGNIFICANT CHANGE UP (ref 0–6.7)
CK SERPL-CCNC: 54 U/L — SIGNIFICANT CHANGE UP (ref 30–200)
CO2 SERPL-SCNC: 26 MMOL/L — SIGNIFICANT CHANGE UP (ref 22–31)
CREAT SERPL-MCNC: 1 MG/DL — SIGNIFICANT CHANGE UP (ref 0.5–1.3)
EGFR: 78 ML/MIN/1.73M2 — SIGNIFICANT CHANGE UP
EOSINOPHIL # BLD AUTO: 0.47 K/UL — SIGNIFICANT CHANGE UP (ref 0–0.5)
EOSINOPHIL NFR BLD AUTO: 9.4 % — HIGH (ref 0–6)
ESTIMATED AVERAGE GLUCOSE: 123 MG/DL — HIGH (ref 68–114)
GLUCOSE SERPL-MCNC: 137 MG/DL — HIGH (ref 70–99)
HCT VFR BLD CALC: 43.7 % — SIGNIFICANT CHANGE UP (ref 39–50)
HDLC SERPL-MCNC: 47 MG/DL — SIGNIFICANT CHANGE UP
HGB BLD-MCNC: 14.3 G/DL — SIGNIFICANT CHANGE UP (ref 13–17)
IMM GRANULOCYTES NFR BLD AUTO: 0.4 % — SIGNIFICANT CHANGE UP (ref 0–1.5)
INR BLD: 0.93 — SIGNIFICANT CHANGE UP (ref 0.88–1.16)
ISTAT INR: 1 — SIGNIFICANT CHANGE UP (ref 0.88–1.16)
ISTAT PT: 12.5 SEC — SIGNIFICANT CHANGE UP (ref 10–12.9)
LIPID PNL WITH DIRECT LDL SERPL: 81 MG/DL — SIGNIFICANT CHANGE UP
LYMPHOCYTES # BLD AUTO: 1.23 K/UL — SIGNIFICANT CHANGE UP (ref 1–3.3)
LYMPHOCYTES # BLD AUTO: 24.6 % — SIGNIFICANT CHANGE UP (ref 13–44)
MCHC RBC-ENTMCNC: 31.7 PG — SIGNIFICANT CHANGE UP (ref 27–34)
MCHC RBC-ENTMCNC: 32.7 GM/DL — SIGNIFICANT CHANGE UP (ref 32–36)
MCV RBC AUTO: 96.9 FL — SIGNIFICANT CHANGE UP (ref 80–100)
MONOCYTES # BLD AUTO: 0.53 K/UL — SIGNIFICANT CHANGE UP (ref 0–0.9)
MONOCYTES NFR BLD AUTO: 10.6 % — SIGNIFICANT CHANGE UP (ref 2–14)
NEUTROPHILS # BLD AUTO: 2.67 K/UL — SIGNIFICANT CHANGE UP (ref 1.8–7.4)
NEUTROPHILS NFR BLD AUTO: 53.2 % — SIGNIFICANT CHANGE UP (ref 43–77)
NON HDL CHOLESTEROL: 104 MG/DL — SIGNIFICANT CHANGE UP
NRBC # BLD: 0 /100 WBCS — SIGNIFICANT CHANGE UP (ref 0–0)
PLATELET # BLD AUTO: 315 K/UL — SIGNIFICANT CHANGE UP (ref 150–400)
POCT ISTAT CREATININE: 1 MG/DL — SIGNIFICANT CHANGE UP (ref 0.5–1.3)
POTASSIUM SERPL-MCNC: 4.6 MMOL/L — SIGNIFICANT CHANGE UP (ref 3.5–5.3)
POTASSIUM SERPL-SCNC: 4.6 MMOL/L — SIGNIFICANT CHANGE UP (ref 3.5–5.3)
PROT SERPL-MCNC: 7.2 G/DL — SIGNIFICANT CHANGE UP (ref 6–8.3)
PROTHROM AB SERPL-ACNC: 11.1 SEC — SIGNIFICANT CHANGE UP (ref 10.5–13.4)
RBC # BLD: 4.51 M/UL — SIGNIFICANT CHANGE UP (ref 4.2–5.8)
RBC # FLD: 13.1 % — SIGNIFICANT CHANGE UP (ref 10.3–14.5)
SODIUM SERPL-SCNC: 141 MMOL/L — SIGNIFICANT CHANGE UP (ref 135–145)
TRIGL SERPL-MCNC: 113 MG/DL — SIGNIFICANT CHANGE UP
WBC # BLD: 5.01 K/UL — SIGNIFICANT CHANGE UP (ref 3.8–10.5)
WBC # FLD AUTO: 5.01 K/UL — SIGNIFICANT CHANGE UP (ref 3.8–10.5)

## 2022-04-22 PROCEDURE — 92978 ENDOLUMINL IVUS OCT C 1ST: CPT | Mod: 26,RC

## 2022-04-22 PROCEDURE — 93458 L HRT ARTERY/VENTRICLE ANGIO: CPT | Mod: 26,59

## 2022-04-22 PROCEDURE — 92928 PRQ TCAT PLMT NTRAC ST 1 LES: CPT | Mod: RC

## 2022-04-22 PROCEDURE — 99152 MOD SED SAME PHYS/QHP 5/>YRS: CPT

## 2022-04-22 PROCEDURE — 93010 ELECTROCARDIOGRAM REPORT: CPT

## 2022-04-22 RX ORDER — ASPIRIN/CALCIUM CARB/MAGNESIUM 324 MG
81 TABLET ORAL ONCE
Refills: 0 | Status: COMPLETED | OUTPATIENT
Start: 2022-04-22 | End: 2022-04-22

## 2022-04-22 RX ORDER — CLOPIDOGREL BISULFATE 75 MG/1
75 TABLET, FILM COATED ORAL DAILY
Refills: 0 | Status: DISCONTINUED | OUTPATIENT
Start: 2022-04-23 | End: 2022-04-23

## 2022-04-22 RX ORDER — NITROGLYCERIN 6.5 MG
1 CAPSULE, EXTENDED RELEASE ORAL
Qty: 0 | Refills: 0 | DISCHARGE

## 2022-04-22 RX ORDER — METOPROLOL TARTRATE 50 MG
100 TABLET ORAL DAILY
Refills: 0 | Status: DISCONTINUED | OUTPATIENT
Start: 2022-04-22 | End: 2022-04-23

## 2022-04-22 RX ORDER — ATORVASTATIN CALCIUM 80 MG/1
40 TABLET, FILM COATED ORAL DAILY
Refills: 0 | Status: DISCONTINUED | OUTPATIENT
Start: 2022-04-22 | End: 2022-04-23

## 2022-04-22 RX ORDER — CHLORHEXIDINE GLUCONATE 213 G/1000ML
1 SOLUTION TOPICAL ONCE
Refills: 0 | Status: DISCONTINUED | OUTPATIENT
Start: 2022-04-22 | End: 2022-04-22

## 2022-04-22 RX ORDER — TAMSULOSIN HYDROCHLORIDE 0.4 MG/1
0.4 CAPSULE ORAL DAILY
Refills: 0 | Status: DISCONTINUED | OUTPATIENT
Start: 2022-04-22 | End: 2022-04-23

## 2022-04-22 RX ORDER — SODIUM CHLORIDE 9 MG/ML
500 INJECTION INTRAMUSCULAR; INTRAVENOUS; SUBCUTANEOUS
Refills: 0 | Status: DISCONTINUED | OUTPATIENT
Start: 2022-04-22 | End: 2022-04-23

## 2022-04-22 RX ORDER — CLOPIDOGREL BISULFATE 75 MG/1
75 TABLET, FILM COATED ORAL ONCE
Refills: 0 | Status: COMPLETED | OUTPATIENT
Start: 2022-04-22 | End: 2022-04-22

## 2022-04-22 RX ORDER — HYDROCHLOROTHIAZIDE 25 MG
25 TABLET ORAL DAILY
Refills: 0 | Status: DISCONTINUED | OUTPATIENT
Start: 2022-04-22 | End: 2022-04-23

## 2022-04-22 RX ORDER — NITROGLYCERIN 6.5 MG
0.4 CAPSULE, EXTENDED RELEASE ORAL
Refills: 0 | Status: DISCONTINUED | OUTPATIENT
Start: 2022-04-22 | End: 2022-04-23

## 2022-04-22 RX ORDER — ASPIRIN/CALCIUM CARB/MAGNESIUM 324 MG
81 TABLET ORAL DAILY
Refills: 0 | Status: DISCONTINUED | OUTPATIENT
Start: 2022-04-23 | End: 2022-04-23

## 2022-04-22 RX ORDER — LOSARTAN/HYDROCHLOROTHIAZIDE 100MG-25MG
1 TABLET ORAL
Qty: 0 | Refills: 0 | DISCHARGE

## 2022-04-22 RX ORDER — CHOLECALCIFEROL (VITAMIN D3) 125 MCG
1 CAPSULE ORAL
Qty: 0 | Refills: 0 | DISCHARGE

## 2022-04-22 RX ORDER — FENOFIBRIC ACID 105 MG/1
1 TABLET ORAL
Qty: 0 | Refills: 0 | DISCHARGE

## 2022-04-22 RX ORDER — CHOLECALCIFEROL (VITAMIN D3) 125 MCG
2000 CAPSULE ORAL DAILY
Refills: 0 | Status: DISCONTINUED | OUTPATIENT
Start: 2022-04-22 | End: 2022-04-23

## 2022-04-22 RX ORDER — TAMSULOSIN HYDROCHLORIDE 0.4 MG/1
1 CAPSULE ORAL
Qty: 0 | Refills: 0 | DISCHARGE

## 2022-04-22 RX ORDER — SODIUM CHLORIDE 9 MG/ML
500 INJECTION INTRAMUSCULAR; INTRAVENOUS; SUBCUTANEOUS
Refills: 0 | Status: DISCONTINUED | OUTPATIENT
Start: 2022-04-22 | End: 2022-04-22

## 2022-04-22 RX ORDER — LOSARTAN POTASSIUM 100 MG/1
100 TABLET, FILM COATED ORAL DAILY
Refills: 0 | Status: DISCONTINUED | OUTPATIENT
Start: 2022-04-22 | End: 2022-04-23

## 2022-04-22 RX ORDER — SODIUM CHLORIDE 9 MG/ML
250 INJECTION INTRAMUSCULAR; INTRAVENOUS; SUBCUTANEOUS ONCE
Refills: 0 | Status: DISCONTINUED | OUTPATIENT
Start: 2022-04-22 | End: 2022-04-22

## 2022-04-22 RX ORDER — METOPROLOL TARTRATE 50 MG
1 TABLET ORAL
Qty: 0 | Refills: 0 | DISCHARGE

## 2022-04-22 RX ADMIN — ATORVASTATIN CALCIUM 40 MILLIGRAM(S): 80 TABLET, FILM COATED ORAL at 19:59

## 2022-04-22 RX ADMIN — SODIUM CHLORIDE 225 MILLILITER(S): 9 INJECTION INTRAMUSCULAR; INTRAVENOUS; SUBCUTANEOUS at 11:00

## 2022-04-22 RX ADMIN — Medication 2000 UNIT(S): at 22:41

## 2022-04-22 RX ADMIN — CLOPIDOGREL BISULFATE 75 MILLIGRAM(S): 75 TABLET, FILM COATED ORAL at 09:00

## 2022-04-22 RX ADMIN — LOSARTAN POTASSIUM 100 MILLIGRAM(S): 100 TABLET, FILM COATED ORAL at 19:58

## 2022-04-22 RX ADMIN — Medication 81 MILLIGRAM(S): at 08:21

## 2022-04-22 RX ADMIN — Medication 25 MILLIGRAM(S): at 19:58

## 2022-04-22 RX ADMIN — SODIUM CHLORIDE 75 MILLILITER(S): 9 INJECTION INTRAMUSCULAR; INTRAVENOUS; SUBCUTANEOUS at 08:22

## 2022-04-22 RX ADMIN — TAMSULOSIN HYDROCHLORIDE 0.4 MILLIGRAM(S): 0.4 CAPSULE ORAL at 19:58

## 2022-04-22 NOTE — DISCHARGE NOTE PROVIDER - NSDCMRMEDTOKEN_GEN_ALL_CORE_FT
aspirin 81 mg oral delayed release tablet: 1 tab(s) orally once a day  atorvastatin 40 mg oral tablet: 1 tab(s) orally once a day (at bedtime)  clopidogrel 75 mg oral tablet: 1 tab(s) orally once a day   fenofibric acid 135 mg oral delayed release capsule: 1 cap(s) orally once a day  losartan-hydrochlorothiazide 100 mg-25 mg oral tablet: 1 tab(s) orally once a day  metFORMIN 500 mg oral tablet: 1 tab(s) orally 2 times a day  nitroglycerin 0.4 mg sublingual tablet: 1 tab(s) sublingual every 5 minutes, As Needed  tamsulosin 0.4 mg oral capsule: 1 cap(s) orally once a day  Toprol- mg oral tablet, extended release: 1 tab(s) orally once a day  Vitamin D3 50 mcg (2000 intl units) oral tablet: 1 tab(s) orally once a day   aspirin 81 mg oral delayed release tablet: 1 tab(s) orally once a day  atorvastatin 40 mg oral tablet: 1 tab(s) orally once a day (at bedtime)  clopidogrel 75 mg oral tablet: 1 tab(s) orally once a day   fenofibric acid 135 mg oral delayed release capsule: 1 cap(s) orally once a day  losartan-hydrochlorothiazide 100 mg-25 mg oral tablet: 1 tab(s) orally once a day  metFORMIN 500 mg oral tablet: 1 tab(s) orally 2 times a day. DO NOT RESTART UNTIL 04/25/2022.   nitroglycerin 0.4 mg sublingual tablet: 1 tab(s) sublingual every 5 minutes, As Needed  tamsulosin 0.4 mg oral capsule: 1 cap(s) orally once a day  Toprol- mg oral tablet, extended release: 1 tab(s) orally once a day  Vitamin D3 50 mcg (2000 intl units) oral tablet: 1 tab(s) orally once a day

## 2022-04-22 NOTE — DISCHARGE NOTE PROVIDER - CARE PROVIDER_API CALL
Prashanth Hairston)  Cardiovascular Disease; Internal Medicine; Interventional Cardiology  [address] [place] [address], [place] [address]  Phone: [phone]  Fax: [phone]  Follow Up Time: 2 weeks

## 2022-04-22 NOTE — DISCHARGE NOTE PROVIDER - NSDCQMACEB_CARD_A_CORE
Daily Note     Today's date: 10/9/2018  Patient name: Diane Lunsford  : 1957  MRN: [id number]  Referring provider: Tiffanie Scott DO  Dx:   Encounter Diagnosis     ICD-10-CM    1  Left hip pain M25 552                   Subjective: Sore today  Objective: See treatment diary below    Precautions: COPD, DM    Daily Treatment Diary     Manual   10/5 10/8          L hip  prom -----          L hip flexor stretching  done done                                                     Exercise Diary  10/1 10/5 10/8 10/9         bridge 10x 10x 10x 20x         SLR Flex, ABD 2x10 hep hep 2x10         clamshells 10x Red tb x20 Red tbx20 Red tb x10  S/L         nustep  20min L1 15min L2 Bike x11min L3         Step ups  4"x10 4"x20 4"x20         Lateral step ups  4" x10 4"x20 4"x20         Heel slides  20x 20x 20x         Lumbar rotation  20x 20x 20x         [place] resisted walking  @#7   2x10 @#7  4x10 @#8  4x10         Sit to stand    Chair x 20 20x chair         SLS kicking    4x5                                                                                                                                  Modalities                                                       Assessment: Tolerated treatment fair  Patient demonstrated fatigue post treatment      Plan: Progress treatment as tolerated 
Yes

## 2022-04-22 NOTE — DISCHARGE NOTE PROVIDER - NSDCCPCAREPLAN_GEN_ALL_CORE_FT
PRINCIPAL DISCHARGE DIAGNOSIS  Diagnosis: CAD (coronary artery disease)  Assessment and Plan of Treatment: You have a diagnosis of coronary artery disease and received stents to your right coronary artery. You have been continued on Aspirin 81mg daily and Plavix (Clopidogrel) 75mg daily. You MUST continue taking the daily Aspirin and Plavix to ensure your stent does not close. DO NOT STOP THESE MEDICATIONS FOR ANY REASON UNLESS OTHERWISE INDICATED BY YOUR CARDIOLOGIST BECAUSE THIS WILL PUT YOU AT RISK FOR A HEART ATTACK. You should refrain from strenuous activity and heavy lifting for 1 week. Please make a follow up appointment with your cardiologist within 1-2 weeks of your discharge. All of your prescriptions have been sent electronically to your pharmacy.     The catheter from your wrist was removed and bleeding was stopped by manual pressure.  Wash the site daily with soap and water.  There is no need to put on a bandage.      Call the Interventional Cardiology and Vascular Team at [phone] or [phone] if any of following occur pertaining to your vascular access site: Bleeding or hematoma formation (collection of blood under the skin), drainage or redness at the puncture site, numbness, decrease in strength, coolness or pale coloration of skin of the leg or hand.       PRINCIPAL DISCHARGE DIAGNOSIS  Diagnosis: CAD (coronary artery disease)  Assessment and Plan of Treatment: You have a diagnosis of coronary artery disease and received stents to your right coronary artery. You have been continued on Aspirin 81mg daily and Plavix (Clopidogrel) 75mg daily. You MUST continue taking the daily Aspirin and Plavix to ensure your stent does not close. DO NOT STOP THESE MEDICATIONS FOR ANY REASON UNLESS OTHERWISE INDICATED BY YOUR CARDIOLOGIST BECAUSE THIS WILL PUT YOU AT RISK FOR A HEART ATTACK. You should refrain from strenuous activity and heavy lifting for 1 week. Please make a follow up appointment with your cardiologist within 1-2 weeks of your discharge. All of your prescriptions have been sent electronically to your pharmacy.     The catheter from your wrist was removed and bleeding was stopped by manual pressure.  Wash the site daily with soap and water.  There is no need to put on a bandage.      Call the Interventional Cardiology and Vascular Team at [phone] or [phone] if any of following occur pertaining to your vascular access site: Bleeding or hematoma formation (collection of blood under the skin), drainage or redness at the puncture site, numbness, decrease in strength, coolness or pale coloration of skin of the leg or hand.      SECONDARY DISCHARGE DIAGNOSES  Diagnosis: Hypertension  Assessment and Plan of Treatment: Please continue your home medications of Losartan-HCTZ 100 mg-25 mg daily and Toprol  mg daily as previously prescribed to you.    Diagnosis: Hyperlipidemia  Assessment and Plan of Treatment: Please continue your home medications of Atorvastatin 40 mg daily and Fenofibric Acid 135 mg daily as previously prescribed to you.    Diagnosis: Type 2 diabetes mellitus  Assessment and Plan of Treatment: Please DO NOT continue taking your home medication of Metformin 500 mg BID until Monday, 04/25/2022. On Monday, you may resume your home medication as it was previously prescribed to you.    Diagnosis: BPH (benign prostatic hyperplasia)  Assessment and Plan of Treatment: Please continue your home medication of Tamsulosin 0.4 mg daily as previously prescribed to you.

## 2022-04-22 NOTE — DISCHARGE NOTE PROVIDER - HOSPITAL COURSE
INCOMPLETE     78 y/o male w/ FHx MI (father,  at age 55), PMHx HTN, HLD, type II DM, CAD (s/p recent PCI, see below), and BPH who presents for staged intervention of residual coronary disease. Patient originally endorsed LIN and left sided chest patient, had an abnormal outpatient CCTA, and subsequently underwent cardiac catheterization w/ Atherectomy/LEIO proximal LAD and findings of significant residual RCA disease. Since prior, patient endorses LIN has improved and denies chest pain, palpitations, dizziness, syncope, abdominal pain, nausea/vomiting, melena, LE edema, fever, chills, cough. Echocardiogram (2022) showed EF 55-60%, mild mitral annular calcification, trace MR, trivial TR, normal trileaflet aortic valve, and no pulmonary hypertension. In light of patient’s risk factors and known residual coronary artery disease, patient presented for staged intervention. Patient is now s/p cardiac catheterization w/ ELIO x 2 proximal RCA and other findings of distal LM 20%, proximal LAD patent stent, proximal LCx 50%, distal LCx 99% (fills via R-R collaterals), proximal OM1 50% (large vessel), and EDP 14 mmHg. Right radial access was used and radial band was eventually removed appropriately and w/o complications.     Patient was seen and examined at bedside on 2022 AM and ______. Right radial access site remained stable. Labs were reviewed and remained stable. No significant events were noted overnight on telemetry and repeat EKG was w/o acute ischemic changes. Patient has now been medically cleared for discharge as per  __________. Patient was given appropriate discharge instructions including medication regimen, access site management, and follow up. Any prescriptions the patient requires refills on have been e-prescribed to patient’s preferred pharmacy.     VS Stable  Gen: NAD, A&O x3  Cards: RRR, clear S1 and S2 without murmur  Pulm: CTA B/L without w/r/r  Right __: No hematoma or ooze, peripheral pulses 2+ B/L  Abd: soft, NT  Ext: no LE edema or ulcerations B/L 76 y/o male w/ FHx MI (father,  at age 55), PMHx HTN, HLD, type II DM, CAD (s/p recent PCI, see below), and BPH who presents for staged intervention of residual coronary disease. Patient originally endorsed LIN and left sided chest patient, had an abnormal outpatient CCTA, and subsequently underwent cardiac catheterization w/ Atherectomy/ELIO proximal LAD and findings of significant residual RCA disease. Since prior, patient endorses LIN has improved and denies chest pain, palpitations, dizziness, syncope, abdominal pain, nausea/vomiting, melena, LE edema, fever, chills, cough. Echocardiogram (2022) showed EF 55-60%, mild mitral annular calcification, trace MR, trivial TR, normal trileaflet aortic valve, and no pulmonary hypertension. In light of patient’s risk factors and known residual coronary artery disease, patient presented for staged intervention. Patient is now s/p cardiac catheterization w/ ELIO x 2 proximal RCA and other findings of distal LM 20%, proximal LAD patent stent, proximal LCx 50%, distal LCx 99% (fills via R-R collaterals), proximal OM1 50% (large vessel), and EDP 14 mmHg. Right radial access was used and radial band was eventually removed appropriately and w/o complications.     Patient was seen and examined at bedside on 2022 AM and denied any complaints on exam. Right radial access site remained stable. Labs were reviewed and remained stable. No significant events were noted overnight on telemetry and repeat EKG was w/o acute ischemic changes. Patient has now been medically cleared for discharge as per Dr. Vera. Patient was given appropriate discharge instructions including medication regimen, access site management, and follow up. Any prescriptions the patient requires refills on have been e-prescribed to patient’s preferred pharmacy.     VS Stable  Gen: NAD, A&O x3  Cards: RRR, clear S1 and S2 without murmur  Pulm: CTA B/L without w/r/r  Right Radial: No hematoma or ooze, peripheral pulses 2+ B/L  Abd: soft, NT  Ext: no LE edema or ulcerations B/L    Reasons for No Cardiac Rehab Referral Rx: Given on prior admission.    Statin Prescribed (PCI this admission): Yes  DAPT: Prescriptions for Aspirin/Plavix e-prescribed to preferred pharmacy.

## 2022-04-22 NOTE — DISCHARGE NOTE PROVIDER - NSDCFUSCHEDAPPT_GEN_ALL_CORE_FT
HUONG MARIEE ; 04/26/2022 ; [facility] [address]   HUONG MARIEE ; 04/29/2022 ; [facility] [address]  HUONG MARIEE ; 05/06/2022 ; [facility] [address]

## 2022-04-23 ENCOUNTER — TRANSCRIPTION ENCOUNTER (OUTPATIENT)
Age: 77
End: 2022-04-23

## 2022-04-23 VITALS — TEMPERATURE: 97 F

## 2022-04-23 LAB
ANION GAP SERPL CALC-SCNC: 9 MMOL/L — SIGNIFICANT CHANGE UP (ref 5–17)
BUN SERPL-MCNC: 17 MG/DL — SIGNIFICANT CHANGE UP (ref 7–23)
CALCIUM SERPL-MCNC: 9.1 MG/DL — SIGNIFICANT CHANGE UP (ref 8.4–10.5)
CHLORIDE SERPL-SCNC: 108 MMOL/L — SIGNIFICANT CHANGE UP (ref 96–108)
CO2 SERPL-SCNC: 26 MMOL/L — SIGNIFICANT CHANGE UP (ref 22–31)
CREAT SERPL-MCNC: 0.86 MG/DL — SIGNIFICANT CHANGE UP (ref 0.5–1.3)
EGFR: 90 ML/MIN/1.73M2 — SIGNIFICANT CHANGE UP
GLUCOSE SERPL-MCNC: 124 MG/DL — HIGH (ref 70–99)
HCT VFR BLD CALC: 37.9 % — LOW (ref 39–50)
HGB BLD-MCNC: 12.3 G/DL — LOW (ref 13–17)
MAGNESIUM SERPL-MCNC: 1.8 MG/DL — SIGNIFICANT CHANGE UP (ref 1.6–2.6)
MCHC RBC-ENTMCNC: 31.5 PG — SIGNIFICANT CHANGE UP (ref 27–34)
MCHC RBC-ENTMCNC: 32.5 GM/DL — SIGNIFICANT CHANGE UP (ref 32–36)
MCV RBC AUTO: 96.9 FL — SIGNIFICANT CHANGE UP (ref 80–100)
NRBC # BLD: 0 /100 WBCS — SIGNIFICANT CHANGE UP (ref 0–0)
PLATELET # BLD AUTO: 273 K/UL — SIGNIFICANT CHANGE UP (ref 150–400)
POTASSIUM SERPL-MCNC: 4 MMOL/L — SIGNIFICANT CHANGE UP (ref 3.5–5.3)
POTASSIUM SERPL-SCNC: 4 MMOL/L — SIGNIFICANT CHANGE UP (ref 3.5–5.3)
RBC # BLD: 3.91 M/UL — LOW (ref 4.2–5.8)
RBC # FLD: 13.2 % — SIGNIFICANT CHANGE UP (ref 10.3–14.5)
SODIUM SERPL-SCNC: 143 MMOL/L — SIGNIFICANT CHANGE UP (ref 135–145)
WBC # BLD: 5.77 K/UL — SIGNIFICANT CHANGE UP (ref 3.8–10.5)
WBC # FLD AUTO: 5.77 K/UL — SIGNIFICANT CHANGE UP (ref 3.8–10.5)

## 2022-04-23 PROCEDURE — 83735 ASSAY OF MAGNESIUM: CPT

## 2022-04-23 PROCEDURE — C1725: CPT

## 2022-04-23 PROCEDURE — 83036 HEMOGLOBIN GLYCOSYLATED A1C: CPT

## 2022-04-23 PROCEDURE — 85025 COMPLETE CBC W/AUTO DIFF WBC: CPT

## 2022-04-23 PROCEDURE — 82565 ASSAY OF CREATININE: CPT

## 2022-04-23 PROCEDURE — 80053 COMPREHEN METABOLIC PANEL: CPT

## 2022-04-23 PROCEDURE — 85027 COMPLETE CBC AUTOMATED: CPT

## 2022-04-23 PROCEDURE — 93005 ELECTROCARDIOGRAM TRACING: CPT

## 2022-04-23 PROCEDURE — C1753: CPT

## 2022-04-23 PROCEDURE — 85730 THROMBOPLASTIN TIME PARTIAL: CPT

## 2022-04-23 PROCEDURE — 82553 CREATINE MB FRACTION: CPT

## 2022-04-23 PROCEDURE — 36415 COLL VENOUS BLD VENIPUNCTURE: CPT

## 2022-04-23 PROCEDURE — 85610 PROTHROMBIN TIME: CPT

## 2022-04-23 PROCEDURE — C1769: CPT

## 2022-04-23 PROCEDURE — 82550 ASSAY OF CK (CPK): CPT

## 2022-04-23 PROCEDURE — 80048 BASIC METABOLIC PNL TOTAL CA: CPT

## 2022-04-23 PROCEDURE — 80061 LIPID PANEL: CPT

## 2022-04-23 PROCEDURE — 99239 HOSP IP/OBS DSCHRG MGMT >30: CPT

## 2022-04-23 PROCEDURE — C1874: CPT

## 2022-04-23 PROCEDURE — C1887: CPT

## 2022-04-23 PROCEDURE — C1894: CPT

## 2022-04-23 RX ORDER — ASPIRIN/CALCIUM CARB/MAGNESIUM 324 MG
1 TABLET ORAL
Qty: 0 | Refills: 0 | DISCHARGE

## 2022-04-23 RX ORDER — METFORMIN HYDROCHLORIDE 850 MG/1
1 TABLET ORAL
Qty: 0 | Refills: 0 | DISCHARGE

## 2022-04-23 RX ORDER — MAGNESIUM OXIDE 400 MG ORAL TABLET 241.3 MG
800 TABLET ORAL ONCE
Refills: 0 | Status: COMPLETED | OUTPATIENT
Start: 2022-04-23 | End: 2022-04-23

## 2022-04-23 RX ORDER — CLOPIDOGREL BISULFATE 75 MG/1
1 TABLET, FILM COATED ORAL
Qty: 30 | Refills: 11
Start: 2022-04-23 | End: 2023-04-17

## 2022-04-23 RX ORDER — ASPIRIN/CALCIUM CARB/MAGNESIUM 324 MG
1 TABLET ORAL
Qty: 30 | Refills: 11
Start: 2022-04-23 | End: 2023-04-17

## 2022-04-23 RX ADMIN — MAGNESIUM OXIDE 400 MG ORAL TABLET 800 MILLIGRAM(S): 241.3 TABLET ORAL at 11:16

## 2022-04-23 RX ADMIN — LOSARTAN POTASSIUM 100 MILLIGRAM(S): 100 TABLET, FILM COATED ORAL at 06:20

## 2022-04-23 RX ADMIN — Medication 25 MILLIGRAM(S): at 06:19

## 2022-04-23 NOTE — DISCHARGE NOTE NURSING/CASE MANAGEMENT/SOCIAL WORK - PATIENT PORTAL LINK FT
You can access the FollowMyHealth Patient Portal offered by [facility] by registering at the following website: [email or web address]. By joining [facility]’s FollowMyHealth portal, you will also be able to view your health information using other applications (apps) compatible with our system.

## 2022-04-23 NOTE — DISCHARGE NOTE NURSING/CASE MANAGEMENT/SOCIAL WORK - NSDCPEFALRISK_GEN_ALL_CORE
For information on Fall & Injury Prevention, visit: [email or web address] OR  [email or web address] OR  https://www.cdc.gov/steadi/patient.html

## 2022-04-25 RX ORDER — METFORMIN HYDROCHLORIDE 850 MG/1
1 TABLET ORAL
Qty: 0 | Refills: 0 | DISCHARGE
Start: 2022-04-25

## 2022-04-26 ENCOUNTER — APPOINTMENT (OUTPATIENT)
Dept: HEART AND VASCULAR | Facility: CLINIC | Age: 77
End: 2022-04-26
Payer: MEDICARE

## 2022-04-26 VITALS
SYSTOLIC BLOOD PRESSURE: 147 MMHG | HEART RATE: 67 BPM | TEMPERATURE: 98.3 F | DIASTOLIC BLOOD PRESSURE: 70 MMHG | WEIGHT: 165 LBS | OXYGEN SATURATION: 98 % | BODY MASS INDEX: 27.49 KG/M2 | HEIGHT: 65 IN

## 2022-04-26 VITALS — DIASTOLIC BLOOD PRESSURE: 72 MMHG | SYSTOLIC BLOOD PRESSURE: 148 MMHG

## 2022-04-26 PROCEDURE — 99214 OFFICE O/P EST MOD 30 MIN: CPT

## 2022-04-26 NOTE — HISTORY OF PRESENT ILLNESS
[FreeTextEntry1] : 78 yo M with HTN DM, HLD, now s/p PCI with ELIO to pLAD on 3/9/2022 and pRCA on 2022 here for follow-up after recent PCI\par The patient reports a significant improvement of his dyspnea on exertion after PCI of his LAD.\par Denies chest pain/shortness of breath/syncope/presyncope/palpitations/LE edema\par Reports compliance with his medications\par \par PMH \par as above\par \par ALL\par NKDA\par \par Discharge Medications \par aspirin 81 mg daily\par atorvastatin 40 mg daily\par clopidogrel 75 mg daily\par Toprol  mg daily\par fenofibric acid 135 mg oral daily\par losartan-hydrochlorothiazide 100 mg-25 mg daily\par metFORMIN 500 mg twice daily\par nitroglycerin 0.4 mg sublingual tablet: 1 tab(s) sublingual every 5 minutes, As Needed\par tamsulosin 0.4 mg\par Vitamin D3 50 mcg (2000 intl units) dailly\par \par FH\par father  at age 55 of MI\par \par SH\par no smoke, 1 bottle of wine/day, occasional whisky, no drugs\par \par EKG 2022 SR, WNL, HR 58 BPM\par EKG 2020 SR, wnl\par \par \par Cardiac Cath 3/9/2022 \par Successful PCI of proximal LAD with ELIO x1 (3.5 x 38) after orbital atherectomy\par LM Distal : There is a 30 % stenosis. Intravascular ultrasound was performed. Imaging shows minimal cross sectional area of 8.9 mm2. Based on the results of this study, the lesion was judged to be non-significant and no intervention was performed.\par LAD: Ostial 30%, followed by 90% severe stenosis with severe calcification in the prox and the 40% stenosis with systolic knuckling in the mid. First diagonal: Mild diffuse disease.\par CX  Proximal circumflex: Mild diffuse disease. There is a 30 % stenosis. First obtuse marginal: The segment is large. Mild diffuse disease. There is a 40 % stenosis in the proximal third portion of the segment. Second obtuse marginal: Mild diffuse disease. Third obtuse marginal: Subtotaled, with left to left collaterals.  \par RCA  Right coronary artery: Prox 80% moderately calcified, distally there is 60% diffuse stenosis and is a small vessel. Right posterior descending artery: Mild diffuse disease.  \par LVDEP  7 mmHg.\par \par Successful PCI of proximal LAD with ELIO x1 (3.5 x 38) after orbital atherectomy\par \par Cardiac catheterization 2022\par Patent proximal LAD stent\par Excessive PCI of proximal RCA with a 2.5 x 30 and a 2.5 x 12 ELIO postdilated  to 2.75 with NC balloon with excellent angiographic result\par \par cCTA 3/1/2022\par -Right dominant coronary arteries with obstructive plaque in the pLAD resulting in up to 50-70% stenosis and pRCA with up to 70-90% stenosis. \par -Extensive burden of calcium, with a score of 1125.2\par -Normal cardiac cardiac structural morphology\par -Normal imaged thoracic aorta, pulmonary vasculature, and systemic veins\par \par Echo 2022\par LVEF 55-60%\par Normal left ventricular size and wall thickness, with normal systolic and diastolic function\par Mild annular calcification\par Normal trileaflet aortic valve with normal opening. \par No evidence of pHTN\par Technically difficulty study\par \par Echocardiogram 2020\par Left ventricular ejection fraction is 60-65%\par Mild MR\par \par Exercise stress test \par The patient exercised for 7 minutes 58 seconds on a Neville protocol (94% of MPHR)\par EKG with 2 mm ST depression upsloping in V5 and V6 at peak exercise\par The study was stopped due to fatigue, No chest pain no arrhythmias during exercise or recovery\par \par Labs 2022\par Cr 1.18\par Tri 145\par Chol 179\par HDL 48\par \par \par 2020\par Cr 1.24 \par \par \par \par

## 2022-04-26 NOTE — ASSESSMENT
[FreeTextEntry1] : 78 yo M with HTN DM, HLD, now s/p PCI with ELIO to pLAD on 3/9/2022  and pRCA on 4/22/2022 here for follow-up post recent PCI\par \par \par CAD s/p PCI of LAD on 3/9/2022 and pRCA on 4/22/2022\par -The patient is asymptomatic with good exercise tolerance\par -Explained to the patient importance of medication compliance\par -ASA 81mg daily indefinitely\par -Plavix 75mg daily for at least 6 months\par -Atorvastatin 40mg daily\par -Toprol 100 mg daily\par -recommend to call clinic immediately if onset of chest pain\par \par HTN\par -Not optimally controlled today, the patient reports a good blood pressure control at home\par -Continue  losartan-hctz  100-25 mg daily \par -Recommended to call the clinic if blood pressure is > 140/90\par -cont with metoprolol 100 mg daily \par -Cr 1.18 2/1/2022\par \par HLD\par -Continue with atorvastatin 40 mg daily\par -Recommended healthy diet/exercise\par -Repeat lipid panel in 4 months\par \par f/u 3 months or sooner if any symptoms

## 2022-04-29 ENCOUNTER — APPOINTMENT (OUTPATIENT)
Dept: HEART AND VASCULAR | Facility: CLINIC | Age: 77
End: 2022-04-29
Payer: MEDICARE

## 2022-04-29 DIAGNOSIS — E78.5 HYPERLIPIDEMIA, UNSPECIFIED: ICD-10-CM

## 2022-04-29 DIAGNOSIS — Z79.02 LONG TERM (CURRENT) USE OF ANTITHROMBOTICS/ANTIPLATELETS: ICD-10-CM

## 2022-04-29 DIAGNOSIS — Z79.82 LONG TERM (CURRENT) USE OF ASPIRIN: ICD-10-CM

## 2022-04-29 DIAGNOSIS — I25.110 ATHEROSCLEROTIC HEART DISEASE OF NATIVE CORONARY ARTERY WITH UNSTABLE ANGINA PECTORIS: ICD-10-CM

## 2022-04-29 DIAGNOSIS — I10 ESSENTIAL (PRIMARY) HYPERTENSION: ICD-10-CM

## 2022-04-29 DIAGNOSIS — Z95.5 PRESENCE OF CORONARY ANGIOPLASTY IMPLANT AND GRAFT: ICD-10-CM

## 2022-04-29 DIAGNOSIS — Z82.49 FAMILY HISTORY OF ISCHEMIC HEART DISEASE AND OTHER DISEASES OF THE CIRCULATORY SYSTEM: ICD-10-CM

## 2022-04-29 DIAGNOSIS — E11.9 TYPE 2 DIABETES MELLITUS WITHOUT COMPLICATIONS: ICD-10-CM

## 2022-04-29 DIAGNOSIS — N40.0 BENIGN PROSTATIC HYPERPLASIA WITHOUT LOWER URINARY TRACT SYMPTOMS: ICD-10-CM

## 2022-04-29 PROCEDURE — 36415 COLL VENOUS BLD VENIPUNCTURE: CPT

## 2022-04-30 LAB
ALBUMIN SERPL ELPH-MCNC: 4.4 G/DL
ALP BLD-CCNC: 42 U/L
ALT SERPL-CCNC: 21 U/L
ANION GAP SERPL CALC-SCNC: 13 MMOL/L
AST SERPL-CCNC: 23 U/L
BILIRUB SERPL-MCNC: 0.4 MG/DL
BUN SERPL-MCNC: 31 MG/DL
CALCIUM SERPL-MCNC: 10.2 MG/DL
CHLORIDE SERPL-SCNC: 101 MMOL/L
CHOLEST SERPL-MCNC: 164 MG/DL
CO2 SERPL-SCNC: 26 MMOL/L
CREAT SERPL-MCNC: 1.16 MG/DL
EGFR: 66 ML/MIN/1.73M2
ESTIMATED AVERAGE GLUCOSE: 131 MG/DL
GLUCOSE SERPL-MCNC: 138 MG/DL
HBA1C MFR BLD HPLC: 6.2 %
HDLC SERPL-MCNC: 46 MG/DL
LDLC SERPL CALC-MCNC: 96 MG/DL
NONHDLC SERPL-MCNC: 118 MG/DL
POTASSIUM SERPL-SCNC: 5.2 MMOL/L
PROT SERPL-MCNC: 6.9 G/DL
SODIUM SERPL-SCNC: 140 MMOL/L
TRIGL SERPL-MCNC: 111 MG/DL

## 2022-05-04 ENCOUNTER — APPOINTMENT (OUTPATIENT)
Dept: INTERNAL MEDICINE | Facility: CLINIC | Age: 77
End: 2022-05-04
Payer: MEDICARE

## 2022-05-04 VITALS
TEMPERATURE: 97.8 F | HEART RATE: 58 BPM | HEIGHT: 65 IN | BODY MASS INDEX: 27.32 KG/M2 | RESPIRATION RATE: 14 BRPM | WEIGHT: 164 LBS | SYSTOLIC BLOOD PRESSURE: 137 MMHG | OXYGEN SATURATION: 96 % | DIASTOLIC BLOOD PRESSURE: 68 MMHG

## 2022-05-04 PROCEDURE — 99214 OFFICE O/P EST MOD 30 MIN: CPT

## 2022-05-04 RX ORDER — SIMVASTATIN 40 MG/1
40 TABLET, FILM COATED ORAL
Qty: 90 | Refills: 0 | Status: DISCONTINUED | COMMUNITY
Start: 2017-03-13 | End: 2022-05-04

## 2022-05-04 RX ORDER — UBIDECARENONE/VIT E ACET 100MG-5
50 MCG CAPSULE ORAL
Qty: 90 | Refills: 3 | Status: ACTIVE | COMMUNITY
Start: 2022-02-07 | End: 1900-01-01

## 2022-05-04 RX ORDER — ATORVASTATIN CALCIUM 40 MG/1
40 TABLET, FILM COATED ORAL
Refills: 0 | Status: DISCONTINUED | COMMUNITY
Start: 2022-03-28 | End: 2022-05-04

## 2022-05-04 NOTE — ASSESSMENT
[FreeTextEntry1] : 77 Y OLD MALE WITH PMX OF HTN ,DM ,DYSLIPIDEMIA  AND RECENT ANGIOPLASTY X2= LABS REVIEWED;ADD EZETIMIBE 10 MG DAILY  TO ACHIEVE LDL OF 70 OR LESS;ALL MEDS RX SENT \par RTO 3 M WITH LABS

## 2022-05-04 NOTE — HISTORY OF PRESENT ILLNESS
[de-identified] : COMES FOR F/U S/P ANGIOPLASTY X2 ,SEEN BY CARDIO RECENTLY ,NOW ON PLAVIX FOR NEXT 6 M

## 2022-05-06 ENCOUNTER — APPOINTMENT (OUTPATIENT)
Dept: INTERNAL MEDICINE | Facility: CLINIC | Age: 77
End: 2022-05-06

## 2022-06-27 ENCOUNTER — APPOINTMENT (OUTPATIENT)
Dept: HEART AND VASCULAR | Facility: CLINIC | Age: 77
End: 2022-06-27

## 2022-06-27 VITALS
DIASTOLIC BLOOD PRESSURE: 57 MMHG | TEMPERATURE: 98 F | OXYGEN SATURATION: 94 % | RESPIRATION RATE: 14 BRPM | SYSTOLIC BLOOD PRESSURE: 111 MMHG | HEART RATE: 59 BPM

## 2022-06-27 PROCEDURE — 99214 OFFICE O/P EST MOD 30 MIN: CPT

## 2022-06-27 RX ORDER — FENOFIBRIC ACID 135 MG/1
135 CAPSULE, DELAYED RELEASE ORAL
Qty: 90 | Refills: 0 | Status: DISCONTINUED | COMMUNITY
Start: 2017-03-13 | End: 2022-06-27

## 2022-06-27 NOTE — HISTORY OF PRESENT ILLNESS
[FreeTextEntry1] : 78 yo M with HTN DM, HLD, now s/p PCI with ELIO to pLAD on 3/9/2022 and pRCA on 2022 here for follow-up. \par He reports that his daughter  a month ago from an accident and he has been under a lot of emotional stress and grief. He wants to make sure that his heart is okay. \par He has no cardiac complaints at this time. \par He denies changes in his exercise tolerance. He can walk 10 blocks and can climb 2 flights of stairs. \par He is complaint with meds \par \par Denies chest pain/shortness of breath/syncope/presyncope/palpitations/LE edema\par \par PMH \par as above\par \par ALL\par NKDA\par \par Medications \par aspirin 81 mg daily\par atorvastatin 40 mg daily\par ezetimibe 10 mg daily\par clopidogrel 75 mg daily\par Toprol  mg daily\par fenofibric acid 135 mg oral daily\par losartan-hydrochlorothiazide 100 mg-25 mg daily\par metFORMIN 500 mg twice daily\par nitroglycerin 0.4 mg sublingual tablet: 1 tab(s) sublingual every 5 minutes, As Needed\par tamsulosin 0.4 mg\par Vitamin D3 50 mcg (2000 intl units) dailly\par \par FH\par father  at age 55 of MI\par \par SH\par no smoke, 1 bottle of wine/day, occasional whisky, no drugs\par \par EKG 2022 SR, WNL, HR 58 BPM\par EKG 2020 SR, wnl\par \par \par Cardiac Cath 3/9/2022 \par Successful PCI of proximal LAD with ELIO x1 (3.5 x 38) after orbital atherectomy\par LM Distal : There is a 30 % stenosis. Intravascular ultrasound was performed. Imaging shows minimal cross sectional area of 8.9 mm2. Based on the results of this study, the lesion was judged to be non-significant and no intervention was performed.\par LAD: Ostial 30%, followed by 90% severe stenosis with severe calcification in the prox and the 40% stenosis with systolic knuckling in the mid. First diagonal: Mild diffuse disease.\par CX  Proximal circumflex: Mild diffuse disease. There is a 30 % stenosis. First obtuse marginal: The segment is large. Mild diffuse disease. There is a 40 % stenosis in the proximal third portion of the segment. Second obtuse marginal: Mild diffuse disease. Third obtuse marginal: Subtotaled, with left to left collaterals.  \par RCA  Right coronary artery: Prox 80% moderately calcified, distally there is 60% diffuse stenosis and is a small vessel. Right posterior descending artery: Mild diffuse disease.  \par LVDEP  7 mmHg.\par \par Successful PCI of proximal LAD with ELIO x1 (3.5 x 38) after orbital atherectomy\par \par Cardiac catheterization 2022\par Patent proximal LAD stent\par Excessive PCI of proximal RCA with a 2.5 x 30 and a 2.5 x 12 ELIO postdilated  to 2.75 with NC balloon with excellent angiographic result\par \par CCTA 3/1/2022\par -Right dominant coronary arteries with obstructive plaque in the pLAD resulting in up to 50-70% stenosis and pRCA with up to 70-90% stenosis. \par -Extensive burden of calcium, with a score of 1125.2\par -Normal cardiac cardiac structural morphology\par -Normal imaged thoracic aorta, pulmonary vasculature, and systemic veins\par \par Echo 2022\par LVEF 55-60%\par Normal left ventricular size and wall thickness, with normal systolic and diastolic function\par Mild annular calcification\par Normal trileaflet aortic valve with normal opening. \par No evidence of pHTN\par Technically difficulty study\par \par Echocardiogram 2020\par Left ventricular ejection fraction is 60-65%\par Mild MR\par \par Exercise stress test \par The patient exercised for 7 minutes 58 seconds on a Neville protocol (94% of MPHR)\par EKG with 2 mm ST depression upsloping in V5 and V6 at peak exercise\par The study was stopped due to fatigue, No chest pain no arrhythmias during exercise or recovery\par \par Labs 2022\par Cr 1.18\par Tri 145\par Chol 179\par HDL 48\par \par \par (22): \par ; ; LDL 96; HDL 46; A1c 6.2; Cre 1.16; K 5.2; LFTs wnl; eGFR 66\par \par 2020\par Cr 1.24 \par \par \par \par

## 2022-06-27 NOTE — PHYSICAL EXAM
[Well Developed] : well developed [Well Nourished] : well nourished [No Acute Distress] : no acute distress [Normal Venous Pressure] : normal venous pressure [No Carotid Bruit] : no carotid bruit [Normal S1, S2] : normal S1, S2 [No Murmur] : no murmur [No Rub] : no rub [No Gallop] : no gallop [Clear Lung Fields] : clear lung fields [Good Air Entry] : good air entry [No Respiratory Distress] : no respiratory distress  [No Edema] : no edema [No Cyanosis] : no cyanosis [No Clubbing] : no clubbing [No Varicosities] : no varicosities [Moves all extremities] : moves all extremities [No Focal Deficits] : no focal deficits [Normal] : alert and oriented, normal memory [Alert and Oriented] : alert and oriented

## 2022-06-27 NOTE — ASSESSMENT
[FreeTextEntry1] : 78 yo M with HTN DM, HLD, now s/p PCI with ELIO to pLAD on 3/9/2022  and pRCA on 4/22/2022 here for follow-up. He has no cardiac complaints at this time. \par \par \par CAD s/p PCI of LAD on 3/9/2022 and pRCA on 4/22/2022\par -The patient is asymptomatic with good exercise tolerance\par -Explained to the patient importance of medication compliance\par -ASA 81mg daily indefinitely\par -Plavix 75mg daily for at least 6 months\par -Increase Atorvastatin 80mg daily. LDL goal <70\par -Toprol 100 mg daily\par -recommend to call clinic immediately if onset of chest pain\par \par HTN\par -Well controlled\par -Continue  losartan-hctz  100-25 mg daily \par -Recommended to call the clinic if blood pressure is > 140/90\par -cont with metoprolol 100 mg daily \par -Cr stable\par \par HLD\par - LDL goal <70\par -increase 80 mg daily\par -continue ezetimibe. \par -stop fenofibrate \par -Recommended healthy diet/exercise\par -Repeat lipid panel in 4 months\par \par f/u 3 months or sooner if any symptoms

## 2022-07-26 ENCOUNTER — APPOINTMENT (OUTPATIENT)
Dept: HEART AND VASCULAR | Facility: CLINIC | Age: 77
End: 2022-07-26

## 2022-07-28 ENCOUNTER — APPOINTMENT (OUTPATIENT)
Dept: HEART AND VASCULAR | Facility: CLINIC | Age: 77
End: 2022-07-28

## 2022-07-28 PROCEDURE — 36415 COLL VENOUS BLD VENIPUNCTURE: CPT

## 2022-07-29 LAB
ALBUMIN SERPL ELPH-MCNC: 4.5 G/DL
ALP BLD-CCNC: 50 U/L
ALT SERPL-CCNC: 27 U/L
ANION GAP SERPL CALC-SCNC: 11 MMOL/L
AST SERPL-CCNC: 22 U/L
BILIRUB SERPL-MCNC: 0.8 MG/DL
BUN SERPL-MCNC: 23 MG/DL
CALCIUM SERPL-MCNC: 9.3 MG/DL
CHLORIDE SERPL-SCNC: 106 MMOL/L
CHOLEST SERPL-MCNC: 103 MG/DL
CO2 SERPL-SCNC: 28 MMOL/L
CREAT SERPL-MCNC: 0.98 MG/DL
EGFR: 80 ML/MIN/1.73M2
ESTIMATED AVERAGE GLUCOSE: 126 MG/DL
GLUCOSE SERPL-MCNC: 112 MG/DL
HBA1C MFR BLD HPLC: 6 %
HDLC SERPL-MCNC: 46 MG/DL
LDLC SERPL CALC-MCNC: 40 MG/DL
NONHDLC SERPL-MCNC: 57 MG/DL
POTASSIUM SERPL-SCNC: 5.1 MMOL/L
PROT SERPL-MCNC: 6.5 G/DL
SODIUM SERPL-SCNC: 145 MMOL/L
TRIGL SERPL-MCNC: 88 MG/DL

## 2022-08-01 ENCOUNTER — APPOINTMENT (OUTPATIENT)
Dept: INTERNAL MEDICINE | Facility: CLINIC | Age: 77
End: 2022-08-01

## 2022-08-01 VITALS
OXYGEN SATURATION: 95 % | BODY MASS INDEX: 27.32 KG/M2 | WEIGHT: 164 LBS | SYSTOLIC BLOOD PRESSURE: 133 MMHG | DIASTOLIC BLOOD PRESSURE: 61 MMHG | HEART RATE: 55 BPM | TEMPERATURE: 98 F | HEIGHT: 65 IN | RESPIRATION RATE: 14 BRPM

## 2022-08-01 PROCEDURE — 99214 OFFICE O/P EST MOD 30 MIN: CPT

## 2022-08-01 NOTE — ASSESSMENT
[FreeTextEntry1] : 77 Y OLD MALE WITH PMX OF HTN ,DM ,DYSLIPIDEMIA ,CAD AND BPH = ALL MEDS RX SENT RTO 3-4 M

## 2022-08-11 NOTE — H&P ADULT - NSICDXFAMILYHX_GEN_ALL_CORE_FT
FAMILY HISTORY:  Father  Still living? Unknown  FHx: myocardial infarction, Age at diagnosis: Age Unknown     11-Aug-2022 17:27

## 2022-10-20 ENCOUNTER — APPOINTMENT (OUTPATIENT)
Dept: HEART AND VASCULAR | Facility: CLINIC | Age: 77
End: 2022-10-20

## 2022-10-20 ENCOUNTER — MED ADMIN CHARGE (OUTPATIENT)
Age: 77
End: 2022-10-20

## 2022-10-20 ENCOUNTER — NON-APPOINTMENT (OUTPATIENT)
Age: 77
End: 2022-10-20

## 2022-10-20 VITALS
DIASTOLIC BLOOD PRESSURE: 68 MMHG | SYSTOLIC BLOOD PRESSURE: 121 MMHG | OXYGEN SATURATION: 96 % | HEART RATE: 57 BPM | TEMPERATURE: 97.5 F | BODY MASS INDEX: 26.99 KG/M2 | HEIGHT: 65 IN | RESPIRATION RATE: 15 BRPM | WEIGHT: 162 LBS

## 2022-10-20 PROCEDURE — G0008: CPT

## 2022-10-20 PROCEDURE — 90662 IIV NO PRSV INCREASED AG IM: CPT

## 2022-10-20 PROCEDURE — 93000 ELECTROCARDIOGRAM COMPLETE: CPT

## 2022-10-20 PROCEDURE — 99214 OFFICE O/P EST MOD 30 MIN: CPT | Mod: 25

## 2022-10-20 NOTE — ASSESSMENT
[FreeTextEntry1] : 76 yo M with HTN DM, HLD, CAD s/p PCI with ELIO to pLAD on 3/9/2022  and pRCA on 4/22/2022 . He is here for follow-up. He has no cardiac complaints at this time. \par - I reviewed labs from 7/27/22. LDL at goal <70\par - I reviewed today's ECG --> ok. \par - stop ezetimibe. \par -continue ASA 81mg daily \par -continue Plavix 75mg daily\par -continue Atorvastatin 80mg daily\par -continue Toprol 100 mg daily\par -Continue  losartan-hctz  100-25 mg daily\par Recommended healthy diet/exercise\par \par f/u 3 months or sooner if any symptoms

## 2022-10-20 NOTE — HISTORY OF PRESENT ILLNESS
[FreeTextEntry1] : 78 yo M with HTN DM, HLD, now s/p PCI with ELIO to pLAD on 3/9/2022 and pRCA on 2022 here for follow-up. \par He reports that his daughter  a month ago from an accident and he has been under a lot of emotional stress and grief. He wants to make sure that his heart is okay. \par He has no cardiac complaints at this time. \par He denies changes in his exercise tolerance. He can walk 10 blocks and can climb 2 flights of stairs. \par He is complaint with meds \par \par Denies chest pain/shortness of breath/syncope/presyncope/palpitations/LE edema\par \par PMH \par as above\par \par ALL\par NKDA\par \par Medications \par aspirin 81 mg daily\par atorvastatin 40 mg daily\par ezetimibe 10 mg daily\par clopidogrel 75 mg daily\par Toprol  mg daily\par fenofibric acid 135 mg oral daily\par losartan-hydrochlorothiazide 100 mg-25 mg daily\par metFORMIN 500 mg twice daily\par nitroglycerin 0.4 mg sublingual tablet: 1 tab(s) sublingual every 5 minutes, As Needed\par tamsulosin 0.4 mg\par Vitamin D3 50 mcg (2000 intl units) dailly\par \par FH\par father  at age 55 of MI\par \par SH\par no smoke, 1 bottle of wine/day, occasional whisky, no drugs\par \par ECG (10/20/22): NSR at 54 bpm with nl axis and no STT abnormality \par EKG 2022 SR, WNL, HR 58 BPM\par EKG 2020 SR, wnl\par \par \par Cardiac Cath 3/9/2022 \par Successful PCI of proximal LAD with ELIO x1 (3.5 x 38) after orbital atherectomy\par LM Distal : There is a 30 % stenosis. Intravascular ultrasound was performed. Imaging shows minimal cross sectional area of 8.9 mm2. Based on the results of this study, the lesion was judged to be non-significant and no intervention was performed.\par LAD: Ostial 30%, followed by 90% severe stenosis with severe calcification in the prox and the 40% stenosis with systolic knuckling in the mid. First diagonal: Mild diffuse disease.\par CX  Proximal circumflex: Mild diffuse disease. There is a 30 % stenosis. First obtuse marginal: The segment is large. Mild diffuse disease. There is a 40 % stenosis in the proximal third portion of the segment. Second obtuse marginal: Mild diffuse disease. Third obtuse marginal: Subtotaled, with left to left collaterals.  \par RCA  Right coronary artery: Prox 80% moderately calcified, distally there is 60% diffuse stenosis and is a small vessel. Right posterior descending artery: Mild diffuse disease.  \par LVDEP  7 mmHg.\par \par Successful PCI of proximal LAD with ELIO x1 (3.5 x 38) after orbital atherectomy\par \par Cardiac catheterization 2022\par Patent proximal LAD stent\par Excessive PCI of proximal RCA with a 2.5 x 30 and a 2.5 x 12 ELIO postdilated  to 2.75 with NC balloon with excellent angiographic result\par \par CCTA 3/1/2022\par -Right dominant coronary arteries with obstructive plaque in the pLAD resulting in up to 50-70% stenosis and pRCA with up to 70-90% stenosis. \par -Extensive burden of calcium, with a score of 1125.2\par -Normal cardiac cardiac structural morphology\par -Normal imaged thoracic aorta, pulmonary vasculature, and systemic veins\par \par Echo 2022\par LVEF 55-60%\par Normal left ventricular size and wall thickness, with normal systolic and diastolic function\par Mild annular calcification\par Normal trileaflet aortic valve with normal opening. \par No evidence of pHTN\par Technically difficulty study\par \par Echocardiogram 2020\par Left ventricular ejection fraction is 60-65%\par Mild MR\par \par Exercise stress test \par The patient exercised for 7 minutes 58 seconds on a Neville protocol (94% of MPHR)\par EKG with 2 mm ST depression upsloping in V5 and V6 at peak exercise\par The study was stopped due to fatigue, No chest pain no arrhythmias during exercise or recovery\par \par Labs\par (22): TG 88; ; LDL 40; HDL 46; NonHDL 57; A1c 6.0; K 5.1; Cre 0.98; LFTs wnl; eGFR 80\par \par  2022\par Cr 1.18\par Tri 145\par Chol 179\par HDL 48\par \par \par (22): \par ; ; LDL 96; HDL 46; A1c 6.2; Cre 1.16; K 5.2; LFTs wnl; eGFR 66\par \par 2020\par Cr 1.24 \par \par \par \par

## 2022-11-01 ENCOUNTER — APPOINTMENT (OUTPATIENT)
Dept: HEART AND VASCULAR | Facility: CLINIC | Age: 77
End: 2022-11-01

## 2022-11-01 PROCEDURE — 36415 COLL VENOUS BLD VENIPUNCTURE: CPT

## 2022-11-02 ENCOUNTER — APPOINTMENT (OUTPATIENT)
Dept: INTERNAL MEDICINE | Facility: CLINIC | Age: 77
End: 2022-11-02

## 2022-11-02 VITALS
BODY MASS INDEX: 27.16 KG/M2 | TEMPERATURE: 98.6 F | HEIGHT: 65 IN | SYSTOLIC BLOOD PRESSURE: 122 MMHG | DIASTOLIC BLOOD PRESSURE: 65 MMHG | WEIGHT: 163 LBS | HEART RATE: 56 BPM | RESPIRATION RATE: 16 BRPM

## 2022-11-02 DIAGNOSIS — R05.9 COUGH, UNSPECIFIED: ICD-10-CM

## 2022-11-02 LAB
ALBUMIN SERPL ELPH-MCNC: 4.5 G/DL
ALP BLD-CCNC: 61 U/L
ALT SERPL-CCNC: 26 U/L
ANION GAP SERPL CALC-SCNC: 13 MMOL/L
AST SERPL-CCNC: 31 U/L
BILIRUB SERPL-MCNC: 0.7 MG/DL
BUN SERPL-MCNC: 20 MG/DL
CALCIUM SERPL-MCNC: 9.6 MG/DL
CHLORIDE SERPL-SCNC: 101 MMOL/L
CHOLEST SERPL-MCNC: 116 MG/DL
CO2 SERPL-SCNC: 28 MMOL/L
CREAT SERPL-MCNC: 0.94 MG/DL
EGFR: 85 ML/MIN/1.73M2
ESTIMATED AVERAGE GLUCOSE: 120 MG/DL
GLUCOSE SERPL-MCNC: 156 MG/DL
HBA1C MFR BLD HPLC: 5.8 %
HDLC SERPL-MCNC: 48 MG/DL
LDLC SERPL CALC-MCNC: 50 MG/DL
NONHDLC SERPL-MCNC: 68 MG/DL
POTASSIUM SERPL-SCNC: 4.7 MMOL/L
PROT SERPL-MCNC: 6.8 G/DL
SODIUM SERPL-SCNC: 142 MMOL/L
TRIGL SERPL-MCNC: 91 MG/DL

## 2022-11-02 PROCEDURE — 99214 OFFICE O/P EST MOD 30 MIN: CPT

## 2022-11-02 PROCEDURE — 90662 IIV NO PRSV INCREASED AG IM: CPT

## 2022-11-02 PROCEDURE — G0008: CPT

## 2022-11-02 NOTE — HISTORY OF PRESENT ILLNESS
[de-identified] : COMES FOR F/U \par CC OF PERSISTENT COUGH FOR OVER 1 Y WITH THICK MUCUS ,NO FEVER ,NO OTEHR SX

## 2022-11-02 NOTE — ASSESSMENT
[FreeTextEntry1] : 77 Y OLD MALE WITH HBA1C 5.8= DECREASE METFORMIN FROM BID TO QD \par DYSLIPIDEMIA = SAME \par ASHD = AS PER CARDIO \par COUGH = MUCINEX AND PFT  \par RTO 3 M

## 2022-11-03 ENCOUNTER — APPOINTMENT (OUTPATIENT)
Dept: PULMONOLOGY | Facility: CLINIC | Age: 77
End: 2022-11-03

## 2022-11-03 PROCEDURE — 94726 PLETHYSMOGRAPHY LUNG VOLUMES: CPT

## 2022-11-03 PROCEDURE — 94729 DIFFUSING CAPACITY: CPT

## 2022-11-03 PROCEDURE — 94060 EVALUATION OF WHEEZING: CPT

## 2022-11-26 ENCOUNTER — RX RENEWAL (OUTPATIENT)
Age: 77
End: 2022-11-26

## 2023-01-21 ENCOUNTER — RX RENEWAL (OUTPATIENT)
Age: 78
End: 2023-01-21

## 2023-02-06 ENCOUNTER — APPOINTMENT (OUTPATIENT)
Dept: HEART AND VASCULAR | Facility: CLINIC | Age: 78
End: 2023-02-06
Payer: MEDICARE

## 2023-02-06 LAB
25(OH)D3 SERPL-MCNC: 46.7 NG/ML
ALBUMIN SERPL ELPH-MCNC: 4.5 G/DL
ALP BLD-CCNC: 58 U/L
ALT SERPL-CCNC: 30 U/L
ANION GAP SERPL CALC-SCNC: 15 MMOL/L
AST SERPL-CCNC: 26 U/L
BASOPHILS # BLD AUTO: 0.09 K/UL
BASOPHILS NFR BLD AUTO: 1.2 %
BILIRUB SERPL-MCNC: 0.9 MG/DL
BUN SERPL-MCNC: 20 MG/DL
CALCIUM SERPL-MCNC: 10 MG/DL
CHLORIDE SERPL-SCNC: 100 MMOL/L
CHOLEST SERPL-MCNC: 141 MG/DL
CO2 SERPL-SCNC: 25 MMOL/L
CREAT SERPL-MCNC: 0.86 MG/DL
EGFR: 90 ML/MIN/1.73M2
EOSINOPHIL # BLD AUTO: 0.66 K/UL
EOSINOPHIL NFR BLD AUTO: 9.1 %
ESTIMATED AVERAGE GLUCOSE: 126 MG/DL
GLUCOSE SERPL-MCNC: 141 MG/DL
HBA1C MFR BLD HPLC: 6 %
HCT VFR BLD CALC: 43.8 %
HDLC SERPL-MCNC: 55 MG/DL
HGB BLD-MCNC: 14.3 G/DL
IMM GRANULOCYTES NFR BLD AUTO: 0.3 %
LDLC SERPL CALC-MCNC: 64 MG/DL
LYMPHOCYTES # BLD AUTO: 1.37 K/UL
LYMPHOCYTES NFR BLD AUTO: 18.9 %
MAN DIFF?: NORMAL
MCHC RBC-ENTMCNC: 31.8 PG
MCHC RBC-ENTMCNC: 32.6 GM/DL
MCV RBC AUTO: 97.6 FL
MONOCYTES # BLD AUTO: 0.57 K/UL
MONOCYTES NFR BLD AUTO: 7.9 %
NEUTROPHILS # BLD AUTO: 4.55 K/UL
NEUTROPHILS NFR BLD AUTO: 62.6 %
NONHDLC SERPL-MCNC: 86 MG/DL
PLATELET # BLD AUTO: 279 K/UL
POTASSIUM SERPL-SCNC: 4.9 MMOL/L
PROT SERPL-MCNC: 6.8 G/DL
PSA FREE FLD-MCNC: 36 %
PSA FREE SERPL-MCNC: 0.15 NG/ML
PSA SERPL-MCNC: 0.42 NG/ML
RBC # BLD: 4.49 M/UL
RBC # FLD: 13.3 %
SODIUM SERPL-SCNC: 140 MMOL/L
TRIGL SERPL-MCNC: 110 MG/DL
TSH SERPL-ACNC: 1.29 UIU/ML
VIT B12 SERPL-MCNC: 488 PG/ML
WBC # FLD AUTO: 7.26 K/UL

## 2023-02-06 PROCEDURE — 36415 COLL VENOUS BLD VENIPUNCTURE: CPT

## 2023-02-07 LAB
APPEARANCE: CLEAR
BILIRUBIN URINE: NEGATIVE
BLOOD URINE: NEGATIVE
COLOR: YELLOW
GLUCOSE QUALITATIVE U: NEGATIVE
KETONES URINE: NEGATIVE
LEUKOCYTE ESTERASE URINE: NEGATIVE
NITRITE URINE: NEGATIVE
PH URINE: 6
PROTEIN URINE: NORMAL
SPECIFIC GRAVITY URINE: 1.02
UROBILINOGEN URINE: NORMAL

## 2023-02-08 ENCOUNTER — APPOINTMENT (OUTPATIENT)
Dept: INTERNAL MEDICINE | Facility: CLINIC | Age: 78
End: 2023-02-08
Payer: MEDICARE

## 2023-02-08 VITALS
OXYGEN SATURATION: 96 % | RESPIRATION RATE: 16 BRPM | HEART RATE: 60 BPM | WEIGHT: 165 LBS | SYSTOLIC BLOOD PRESSURE: 137 MMHG | HEIGHT: 65 IN | TEMPERATURE: 98.2 F | DIASTOLIC BLOOD PRESSURE: 80 MMHG | BODY MASS INDEX: 27.49 KG/M2

## 2023-02-08 DIAGNOSIS — R06.02 SHORTNESS OF BREATH: ICD-10-CM

## 2023-02-08 DIAGNOSIS — R79.89 OTHER SPECIFIED ABNORMAL FINDINGS OF BLOOD CHEMISTRY: ICD-10-CM

## 2023-02-08 DIAGNOSIS — I10 ESSENTIAL (PRIMARY) HYPERTENSION: ICD-10-CM

## 2023-02-08 PROCEDURE — 99214 OFFICE O/P EST MOD 30 MIN: CPT

## 2023-02-08 NOTE — PHYSICAL EXAM
[No Acute Distress] : no acute distress [Normal Rhythm/Effort] : normal respiratory rhythm and effort [Decreased Breath Sounds] : breath sounds were decreased diffusely [Normal to Percussion] : the lungs were normal to percussion [Coordination Grossly Intact] : coordination grossly intact [Normal] : affect was normal and insight and judgment were intact

## 2023-02-08 NOTE — HISTORY OF PRESENT ILLNESS
[de-identified] : COMES FOR F/U DM ,DYSLIPIDEMIA ,COUGH AND HTN ;SEEN BY OPHTHA WHO NOTICED SOME HTN RETINOPATHY ?\par PT HAS BEEN MEASURING BP N AM WITH [id number]

## 2023-02-08 NOTE — ASSESSMENT
[FreeTextEntry1] : 77 Y OLD MALE WITH HTN NOT AT GOAL IN AM = ADD EXTRA METOPROLOL SUCC 50 MG DAILY ,CONTINUE LOSARTAN 100-25 DAY\par DM = HBA1C 6.0\par DYSLIPIDEMIA = ON ATORVA \par COPD AS PER PUL EVAL = ALBUTEROL INH RX ,PT WITH LESS LIN AND NO COUGH LAST 2 M \par RTO 3 M

## 2023-02-09 RX ORDER — ALBUTEROL SULFATE 90 UG/1
108 (90 BASE) INHALANT RESPIRATORY (INHALATION)
Qty: 1 | Refills: 1 | Status: ACTIVE | COMMUNITY
Start: 2020-01-15 | End: 1900-01-01

## 2023-03-13 ENCOUNTER — APPOINTMENT (OUTPATIENT)
Dept: HEART AND VASCULAR | Facility: CLINIC | Age: 78
End: 2023-03-13
Payer: MEDICARE

## 2023-03-13 ENCOUNTER — NON-APPOINTMENT (OUTPATIENT)
Age: 78
End: 2023-03-13

## 2023-03-13 VITALS
TEMPERATURE: 98 F | WEIGHT: 167 LBS | OXYGEN SATURATION: 95 % | HEART RATE: 57 BPM | RESPIRATION RATE: 16 BRPM | SYSTOLIC BLOOD PRESSURE: 120 MMHG | DIASTOLIC BLOOD PRESSURE: 64 MMHG | HEIGHT: 65 IN | BODY MASS INDEX: 27.82 KG/M2

## 2023-03-13 DIAGNOSIS — Z01.810 ENCOUNTER FOR PREPROCEDURAL CARDIOVASCULAR EXAMINATION: ICD-10-CM

## 2023-03-13 PROCEDURE — 99214 OFFICE O/P EST MOD 30 MIN: CPT | Mod: 25

## 2023-03-13 PROCEDURE — 93000 ELECTROCARDIOGRAM COMPLETE: CPT

## 2023-03-13 NOTE — HISTORY OF PRESENT ILLNESS
[FreeTextEntry1] : 76 yo M with HTN DM, HLD, now s/p PCI with ELIO to pLAD on 3/9/2022 and pRCA on 2022 here for follow-up. \par He reports taking higher metoprolol dose and noticing that HR is in the low 50s. \par He is requesting clearance to have tooth implant. \par He is otherwise doing well. \par He has no cardiac complaints at this time. \par He denies changes in his exercise tolerance. He can walk 10 blocks and can climb 2 flights of stairs. \par He is complaint with meds \par \par Denies chest pain/shortness of breath/syncope/presyncope/palpitations/LE edema\par \par PMH \par as above\par \par ALL\par NKDA\par \par Medications \par aspirin 81 mg daily\par atorvastatin 40 mg daily\par ezetimibe 10 mg daily\par clopidogrel 75 mg daily\par Toprol  mg daily\par fenofibric acid 135 mg oral daily\par losartan-hydrochlorothiazide 100 mg-25 mg daily\par metFORMIN 500 mg twice daily\par nitroglycerin 0.4 mg sublingual tablet: 1 tab(s) sublingual every 5 minutes, As Needed\par tamsulosin 0.4 mg\par Vitamin D3 50 mcg (2000 intl units) dailly\par \par FH\par father  at age 57 of [place]\par \par SH\par no smoke, 1 bottle of wine/day, occasional whisky, no drugs\par \par ECG (3/13/23): NSR at 55 bpm w nl axis and no STT abnormality \par ECG (10/20/22): NSR at 54 bpm with nl axis and no STT abnormality \par EKG 2022 SR, WNL, HR 58 BPM\par EKG 2020 SR, wnl\par \par \par Cardiac Cath 3/9/2022 \par Successful PCI of proximal LAD with ELIO x1 (3.5 x 38) after orbital atherectomy\par LM Distal : There is a 30 % stenosis. Intravascular ultrasound was performed. Imaging shows minimal cross sectional area of 8.9 mm2. Based on the results of this study, the lesion was judged to be non-significant and no intervention was performed.\par LAD: Ostial 30%, followed by 90% severe stenosis with severe calcification in the prox and the 40% stenosis with systolic knuckling in the mid. First diagonal: Mild diffuse disease.\par CX  Proximal circumflex: Mild diffuse disease. There is a 30 % stenosis. First obtuse marginal: The segment is large. Mild diffuse disease. There is a 40 % stenosis in the proximal third portion of the segment. Second obtuse marginal: Mild diffuse disease. Third obtuse marginal: Subtotaled, with left to left collaterals.  \par RCA  Right coronary artery: Prox 80% moderately calcified, distally there is 60% diffuse stenosis and is a small vessel. Right posterior descending artery: Mild diffuse disease.  \par LVDEP  7 mmHg.\par \par Successful PCI of proximal LAD with ELIO x1 (3.5 x 38) after orbital atherectomy\par \par Cardiac catheterization 2022\par Patent proximal LAD stent\par Excessive PCI of proximal RCA with a 2.5 x 30 and a 2.5 x 12 ELIO postdilated  to 2.75 with NC balloon with excellent angiographic result\par \par CCTA 3/1/2022\par -Right dominant coronary arteries with obstructive plaque in the pLAD resulting in up to 50-70% stenosis and pRCA with up to 70-90% stenosis. \par -Extensive burden of calcium, with a score of 1125.2\par -Normal cardiac cardiac structural morphology\par -Normal imaged thoracic aorta, pulmonary vasculature, and systemic veins\par \par Echo 2022\par LVEF 55-60%\par Normal left ventricular size and wall thickness, with normal systolic and diastolic function\par Mild annular calcification\par Normal trileaflet aortic valve with normal opening. \par No evidence of pHTN\par Technically difficulty study\par \par Echocardiogram 2020\par Left ventricular ejection fraction is 60-65%\par Mild MR\par \par Exercise stress test \par The patient exercised for 7 minutes 58 seconds on a Neville protocol (94% of MPHR)\par EKG with 2 mm ST depression upsloping in V5 and V6 at peak exercise\par The study was stopped due to fatigue, No chest pain no arrhythmias during exercise or recovery\par \par Labs\par (23): Hgb 14.3; Hct 43.8; A1c 6.0; ; ; LDL 64; LDL 64; NonHDL 86; Cre 0.86; K 4.9; LFTs wnl; eGFR 90; \par (22): TG 88; ; LDL 40; HDL 46; NonHDL 57; A1c 6.0; K 5.1; Cre 0.98; LFTs wnl; eGFR 80\par \par  2022\par Cr 1.18\par Tri 145\par Chol 179\par HDL 48\par \par \par (22): \par ; ; LDL 96; HDL 46; A1c 6.2; Cre 1.16; K 5.2; LFTs wnl; eGFR 66\par \par 2020\par Cr 1.24 \par \par \par \par

## 2023-03-13 NOTE — REVIEW OF SYSTEMS
[Fever] : no fever [Headache] : no headache [Chills] : no chills [Feeling Fatigued] : not feeling fatigued [SOB] : no shortness of breath [Dyspnea on exertion] : not dyspnea during exertion [Chest Discomfort] : no chest discomfort [Lower Ext Edema] : no extremity edema [Leg Claudication] : no intermittent leg claudication [Palpitations] : no palpitations [Orthopnea] : no orthopnea [PND] : no PND [Syncope] : no syncope [Cough] : no cough [Wheezing] : no wheezing [Coughing Up Blood] : no hemoptysis [Dizziness] : no dizziness [Convulsions] : no convulsions [Limb Weakness (Paresis)] : no limb weakness (Paresis) [Confusion] : no confusion was observed [Depression] : no depression [Anxiety] : no anxiety [Under Stress] : not under stress [Suicidal] : not suicidal [Easy Bleeding] : no tendency for easy bleeding good balance

## 2023-03-13 NOTE — ASSESSMENT
[FreeTextEntry1] : 78 yo M with HTN DM, HLD, CAD s/p PCI with ELIO to pLAD on 3/9/2022  and pRCA on 4/22/2022 . He is here for follow-up. He has no cardiac complaints at this time. He is requesting clearance to have tooth implant. He reports fair exercise capacity \par - I reviewed labs from 2/6/23 --> LDL <70 \par - I reviewed today's ECG --> ok. \par - May stop ASA 81\par -continue Plavix 75mg daily\par -continue Atorvastatin 80mg daily\par -decrease Toprol to 100 mg daily\par - take amlodipine 2.5 mg daily \par -Continue  losartan-hctz  100-25 mg daily\par - he will continue to monitor his BP at home and keep a BP log to bring to next visit \par - He is stable from cardiac standpoint. There is no cardiac contraindication to having planned procedure. He may hold plavix 5-7 days prior to procedure and resume as soon as possible. There is no indication for prophylactic antibiotics. \par Recommended healthy diet/exercise\par \par f/u 3 months or sooner if any symptoms

## 2023-04-21 ENCOUNTER — RX RENEWAL (OUTPATIENT)
Age: 78
End: 2023-04-21

## 2023-05-02 ENCOUNTER — RX RENEWAL (OUTPATIENT)
Age: 78
End: 2023-05-02

## 2023-05-05 ENCOUNTER — LABORATORY RESULT (OUTPATIENT)
Age: 78
End: 2023-05-05

## 2023-05-05 ENCOUNTER — APPOINTMENT (OUTPATIENT)
Dept: HEART AND VASCULAR | Facility: CLINIC | Age: 78
End: 2023-05-05
Payer: MEDICARE

## 2023-05-05 PROCEDURE — 36415 COLL VENOUS BLD VENIPUNCTURE: CPT

## 2023-05-06 ENCOUNTER — RX RENEWAL (OUTPATIENT)
Age: 78
End: 2023-05-06

## 2023-05-10 ENCOUNTER — APPOINTMENT (OUTPATIENT)
Dept: INTERNAL MEDICINE | Facility: CLINIC | Age: 78
End: 2023-05-10
Payer: MEDICARE

## 2023-05-10 VITALS
DIASTOLIC BLOOD PRESSURE: 59 MMHG | SYSTOLIC BLOOD PRESSURE: 112 MMHG | BODY MASS INDEX: 28.16 KG/M2 | HEIGHT: 65 IN | TEMPERATURE: 98 F | OXYGEN SATURATION: 96 % | RESPIRATION RATE: 15 BRPM | WEIGHT: 169 LBS | HEART RATE: 67 BPM

## 2023-05-10 DIAGNOSIS — R00.1 BRADYCARDIA, UNSPECIFIED: ICD-10-CM

## 2023-05-10 PROCEDURE — 99214 OFFICE O/P EST MOD 30 MIN: CPT

## 2023-05-10 RX ORDER — NITROGLYCERIN 0.4 MG/1
0.4 TABLET SUBLINGUAL
Qty: 10 | Refills: 0 | Status: DISCONTINUED | COMMUNITY
Start: 2022-03-03 | End: 2023-05-10

## 2023-05-10 RX ORDER — GUAIFENESIN 600 MG/1
600 TABLET, EXTENDED RELEASE ORAL TWICE DAILY
Qty: 120 | Refills: 2 | Status: DISCONTINUED | COMMUNITY
Start: 2022-11-02 | End: 2023-05-10

## 2023-05-10 RX ORDER — METOPROLOL SUCCINATE 50 MG/1
50 TABLET, EXTENDED RELEASE ORAL
Qty: 90 | Refills: 0 | Status: DISCONTINUED | COMMUNITY
Start: 2023-02-08 | End: 2023-05-10

## 2023-05-10 NOTE — ASSESSMENT
[FreeTextEntry1] : 78 Y OLD MALE WITH PMX OF HTN ,DM,DYSLIPIDEMIA ADN BPH = LABS AND MEDS ORDERED \par RTO 3 M

## 2023-05-16 ENCOUNTER — RX RENEWAL (OUTPATIENT)
Age: 78
End: 2023-05-16

## 2023-06-07 ENCOUNTER — APPOINTMENT (OUTPATIENT)
Dept: INTERNAL MEDICINE | Facility: CLINIC | Age: 78
End: 2023-06-07
Payer: MEDICARE

## 2023-06-07 VITALS
WEIGHT: 167 LBS | HEIGHT: 65 IN | SYSTOLIC BLOOD PRESSURE: 133 MMHG | OXYGEN SATURATION: 97 % | HEART RATE: 60 BPM | RESPIRATION RATE: 16 BRPM | TEMPERATURE: 97.4 F | DIASTOLIC BLOOD PRESSURE: 65 MMHG | BODY MASS INDEX: 27.82 KG/M2

## 2023-06-07 DIAGNOSIS — R19.7 DIARRHEA, UNSPECIFIED: ICD-10-CM

## 2023-06-07 PROCEDURE — 99214 OFFICE O/P EST MOD 30 MIN: CPT

## 2023-06-07 RX ORDER — DESLORATADINE 5 MG/1
5 TABLET ORAL DAILY
Qty: 30 | Refills: 2 | Status: ACTIVE | COMMUNITY
Start: 2023-06-07 | End: 1900-01-01

## 2023-06-07 NOTE — HISTORY OF PRESENT ILLNESS
[de-identified] : PT COMES WITH CC OF LOOSE BM FOR ABOUT 1 M \par CC OF PERSISTENT DERMATITIS DESPITE SEVERAL VISITS TO DERM \[facility]\par DENIES FEVER OR CHILL \par NL APPETITE

## 2023-06-07 NOTE — PHYSICAL EXAM
[No Acute Distress] : no acute distress [Normal Outer Ear/Nose] : the outer ears and nose were normal in appearance [Normal] : soft, non-tender, non-distended, no masses palpated, no HSM and normal bowel sounds

## 2023-06-07 NOTE — ASSESSMENT
[FreeTextEntry1] : 78 Y OLD MALE WITH DIARRHEA ? ETIO = EMPIRIC FLAGYL AND PROBIOTICS ,IF NOT BETTER TO SEE GI \par PRURITIC RASH DESLORATADINE AND DERM EVAL

## 2023-06-15 ENCOUNTER — APPOINTMENT (OUTPATIENT)
Dept: HEART AND VASCULAR | Facility: CLINIC | Age: 78
End: 2023-06-15
Payer: MEDICARE

## 2023-06-15 VITALS
TEMPERATURE: 98.1 F | RESPIRATION RATE: 14 BRPM | OXYGEN SATURATION: 95 % | HEART RATE: 56 BPM | SYSTOLIC BLOOD PRESSURE: 116 MMHG | DIASTOLIC BLOOD PRESSURE: 61 MMHG

## 2023-06-15 PROCEDURE — 99214 OFFICE O/P EST MOD 30 MIN: CPT

## 2023-06-15 RX ORDER — EZETIMIBE 10 MG/1
10 TABLET ORAL
Qty: 90 | Refills: 1 | Status: DISCONTINUED | COMMUNITY
Start: 2022-05-04 | End: 2023-06-15

## 2023-06-15 RX ORDER — ASPIRIN 81 MG/1
81 TABLET, COATED ORAL
Qty: 90 | Refills: 1 | Status: DISCONTINUED | COMMUNITY
Start: 2023-05-16 | End: 2023-06-15

## 2023-06-15 RX ORDER — ASPIRIN ENTERIC COATED TABLETS 81 MG 81 MG/1
81 TABLET, DELAYED RELEASE ORAL
Qty: 90 | Refills: 2 | Status: DISCONTINUED | COMMUNITY
Start: 2022-03-03 | End: 2023-06-15

## 2023-06-15 NOTE — ASSESSMENT
[FreeTextEntry1] : 76 yo M with HTN DM, HLD, CAD s/p PCI with ELIO to pLAD on 3/9/2022  and pRCA on 4/22/2022 . He is here for follow-up. He has no cardiac complaints at this time. He reports fair exercise capacity \par - I reviewed labs from 2/6/23 --> LDL <70 \par - I reviewed ECG --> ok. \par - continue Plavix 75mg daily\par - continue Atorvastatin 80mg daily\par - continue Toprol to 100 mg daily\par - continue amlodipine 2.5 mg daily \par - Continue  losartan-hctz  100-25 mg daily\par - he will continue to monitor his BP at home and keep a BP log to bring to next visit \par  \par Recommended healthy diet/exercise\par \par f/u 4 months or sooner if any symptoms

## 2023-06-15 NOTE — HISTORY OF PRESENT ILLNESS
[FreeTextEntry1] : 78 yo M with HTN DM, HLD, now s/p PCI with ELIO to pLAD on 3/9/2022 and pRCA on 2022 here for follow-up. \par He reports taking higher metoprolol dose and noticing that HR is in the low 50s. \par \par He is otherwise doing well. \par He has no cardiac complaints at this time. \par He denies changes in his exercise tolerance. He can walk 10 blocks and can climb 2 flights of stairs. \par He is complaint with meds \par \par Denies chest pain/shortness of breath/syncope/presyncope/palpitations/LE edema\par \par PMH \par as above\par \par ALL\par NKDA\par \par Medications \par atorvastatin 40 mg daily\par clopidogrel 75 mg daily\par Toprol  mg daily\par losartan-hydrochlorothiazide 100 mg-25 mg daily\par metFORMIN 500 mg twice daily\par nitroglycerin 0.4 mg sublingual tablet: 1 tab(s) sublingual every 5 minutes, As Needed\par tamsulosin 0.4 mg\par Vitamin D3 50 mcg (2000 intl units) dailly\par \par FH\par father  at age 55 of [place]\par \par SH\par no smoke, 1 bottle of wine/day, occasional whisky, no drugs\par \par ECG (3/13/23): NSR at 55 bpm w nl axis and no STT abnormality \par ECG (10/20/22): NSR at 54 bpm with nl axis and no STT abnormality \par EKG 2022 SR, WNL, HR 58 BPM\par EKG 2020 SR, wnl\par \par \par Cardiac Cath 3/9/2022 \par Successful PCI of proximal LAD with ELIO x1 (3.5 x 38) after orbital atherectomy\par LM Distal : There is a 30 % stenosis. Intravascular ultrasound was performed. Imaging shows minimal cross sectional area of 8.9 mm2. Based on the results of this study, the lesion was judged to be non-significant and no intervention was performed.\par LAD: Ostial 30%, followed by 90% severe stenosis with severe calcification in the prox and the 40% stenosis with systolic knuckling in the mid. First diagonal: Mild diffuse disease.\par CX  Proximal circumflex: Mild diffuse disease. There is a 30 % stenosis. First obtuse marginal: The segment is large. Mild diffuse disease. There is a 40 % stenosis in the proximal third portion of the segment. Second obtuse marginal: Mild diffuse disease. Third obtuse marginal: Subtotaled, with left to left collaterals.  \par RCA  Right coronary artery: Prox 80% moderately calcified, distally there is 60% diffuse stenosis and is a small vessel. Right posterior descending artery: Mild diffuse disease.  \par LVDEP  7 mmHg.\par \par Successful PCI of proximal LAD with ELIO x1 (3.5 x 38) after orbital atherectomy\par \par Cardiac catheterization 2022\par Patent proximal LAD stent\par Excessive PCI of proximal RCA with a 2.5 x 30 and a 2.5 x 12 ELIO postdilated  to 2.75 with NC balloon with excellent angiographic result\par \par CCTA 3/1/2022\par -Right dominant coronary arteries with obstructive plaque in the pLAD resulting in up to 50-70% stenosis and pRCA with up to 70-90% stenosis. \par -Extensive burden of calcium, with a score of 1125.2\par -Normal cardiac cardiac structural morphology\par -Normal imaged thoracic aorta, pulmonary vasculature, and systemic veins\par \par Echo 2022\par LVEF 55-60%\par Normal left ventricular size and wall thickness, with normal systolic and diastolic function\par Mild annular calcification\par Normal trileaflet aortic valve with normal opening. \par No evidence of pHTN\par Technically difficulty study\par \par Echocardiogram 2020\par Left ventricular ejection fraction is 60-65%\par Mild MR\par \par Exercise stress test \par The patient exercised for 7 minutes 58 seconds on a Neville protocol (94% of MPHR)\par EKG with 2 mm ST depression upsloping in V5 and V6 at peak exercise\par The study was stopped due to fatigue, No chest pain no arrhythmias during exercise or recovery\par \par Labs\par (23): Hgb 14.3; Hct 43.8; A1c 6.0; ; ; LDL 64; LDL 64; NonHDL 86; Cre 0.86; K 4.9; LFTs wnl; eGFR 90; \par (22): TG 88; ; LDL 40; HDL 46; NonHDL 57; A1c 6.0; K 5.1; Cre 0.98; LFTs wnl; eGFR 80\par \par  2022\par Cr 1.18\par Tri 145\par Chol 179\par HDL 48\par \par \par (22): \par ; ; LDL 96; HDL 46; A1c 6.2; Cre 1.16; K 5.2; LFTs wnl; eGFR 66\par \par 2020\par Cr 1.24 \par \par \par \par

## 2023-07-19 ENCOUNTER — APPOINTMENT (OUTPATIENT)
Dept: HEART AND VASCULAR | Facility: CLINIC | Age: 78
End: 2023-07-19

## 2023-07-31 ENCOUNTER — APPOINTMENT (OUTPATIENT)
Dept: HEART AND VASCULAR | Facility: CLINIC | Age: 78
End: 2023-07-31
Payer: MEDICARE

## 2023-07-31 VITALS
HEIGHT: 65 IN | HEART RATE: 60 BPM | RESPIRATION RATE: 14 BRPM | SYSTOLIC BLOOD PRESSURE: 108 MMHG | TEMPERATURE: 98 F | DIASTOLIC BLOOD PRESSURE: 54 MMHG | BODY MASS INDEX: 27.82 KG/M2 | WEIGHT: 167 LBS | OXYGEN SATURATION: 94 %

## 2023-07-31 DIAGNOSIS — L30.9 DERMATITIS, UNSPECIFIED: ICD-10-CM

## 2023-07-31 PROCEDURE — 99214 OFFICE O/P EST MOD 30 MIN: CPT

## 2023-07-31 RX ORDER — METRONIDAZOLE 500 MG/1
500 TABLET ORAL 3 TIMES DAILY
Qty: 21 | Refills: 0 | Status: DISCONTINUED | COMMUNITY
Start: 2023-06-07 | End: 2023-07-31

## 2023-07-31 NOTE — PHYSICAL EXAM
[Well Developed] : well developed [Well Nourished] : well nourished [No Acute Distress] : no acute distress [Normal Venous Pressure] : normal venous pressure [No Carotid Bruit] : no carotid bruit [Normal S1, S2] : normal S1, S2 [No Murmur] : no murmur [No Rub] : no rub [No Gallop] : no gallop [Clear Lung Fields] : clear lung fields [Good Air Entry] : good air entry [No Respiratory Distress] : no respiratory distress  [No Edema] : no edema [No Cyanosis] : no cyanosis [No Clubbing] : no clubbing [No Varicosities] : no varicosities [Moves all extremities] : moves all extremities [No Focal Deficits] : no focal deficits [Normal] : alert and oriented, normal memory [Alert and Oriented] : alert and oriented [Rash] : rash

## 2023-07-31 NOTE — HISTORY OF PRESENT ILLNESS
[FreeTextEntry1] : 79 yo M with HTN DM, HLD, now s/p PCI with ELIO to pLAD on 3/9/2022 and pRCA on 2022 here for follow-up.   He reports having pruritic rash that somewhat improved when he stopped taking clopidogrel to have an EGD. He thought it may be metformin. Unclear whether it is medication induced  He has seen an allergist and no answer was found He saw a dermatologist and was told it was a dermatitis.   He is otherwise doing well.  He has no cardiac complaints at this time.  He denies changes in his exercise tolerance. He can walk 10 blocks and can climb 2 flights of stairs.  He is complaint with meds   Denies chest pain/shortness of breath/syncope/presyncope/palpitations/LE edema  PMH  as above  ALL NKDA  Medications  atorvastatin 40 mg daily clopidogrel 75 mg daily Toprol  mg daily losartan-hydrochlorothiazide 100 mg-25 mg daily metFORMIN 500 mg twice daily nitroglycerin 0.4 mg sublingual tablet: 1 tab(s) sublingual every 5 minutes, As Needed tamsulosin 0.4 mg Vitamin D3 50 mcg (2000 intl units) barrettllravindra  FH father  at age 55 of [place]  SH no smoke, 1 bottle of wine/day, occasional whisky, no drugs  ECG (3/13/23): NSR at 55 bpm w nl axis and no STT abnormality  ECG (10/20/22): NSR at 54 bpm with nl axis and no STT abnormality  EKG 2022 SR, WNL, HR 58 BPM EKG 2020 SR, wnl   Cardiac Cath 3/9/2022  Successful PCI of proximal LAD with ELIO x1 (3.5 x 38) after orbital atherectomy LM Distal : There is a 30 % stenosis. Intravascular ultrasound was performed. Imaging shows minimal cross sectional area of 8.9 mm2. Based on the results of this study, the lesion was judged to be non-significant and no intervention was performed. LAD: Ostial 30%, followed by 90% severe stenosis with severe calcification in the prox and the 40% stenosis with systolic knuckling in the mid. First diagonal: Mild diffuse disease. CX  Proximal circumflex: Mild diffuse disease. There is a 30 % stenosis. First obtuse marginal: The segment is large. Mild diffuse disease. There is a 40 % stenosis in the proximal third portion of the segment. Second obtuse marginal: Mild diffuse disease. Third obtuse marginal: Subtotaled, with left to left collaterals.   RCA  Right coronary artery: Prox 80% moderately calcified, distally there is 60% diffuse stenosis and is a small vessel. Right posterior descending artery: Mild diffuse disease.   LVDEP  7 mmHg.  Successful PCI of proximal LAD with ELIO x1 (3.5 x 38) after orbital atherectomy  Cardiac catheterization 2022 Patent proximal LAD stent Excessive PCI of proximal RCA with a 2.5 x 30 and a 2.5 x 12 ELIO postdilated  to 2.75 with NC balloon with excellent angiographic result  CCTA 3/1/2022 -Right dominant coronary arteries with obstructive plaque in the pLAD resulting in up to 50-70% stenosis and pRCA with up to 70-90% stenosis.  -Extensive burden of calcium, with a score of 1125.2 -Normal cardiac cardiac structural morphology -Normal imaged thoracic aorta, pulmonary vasculature, and systemic veins  Echo 2022 LVEF 55-60% Normal left ventricular size and wall thickness, with normal systolic and diastolic function Mild annular calcification Normal trileaflet aortic valve with normal opening.  No evidence of pHTN Technically difficulty study  Echocardiogram 2020 Left ventricular ejection fraction is 60-65% Mild MR  Exercise stress test  The patient exercised for 7 minutes 58 seconds on a Neville protocol (94% of MPHR) EKG with 2 mm ST depression upsloping in V5 and V6 at peak exercise The study was stopped due to fatigue, No chest pain no arrhythmias during exercise or recovery  Labs (23): Cre 0.9; K 5.2; eGFR 89; LFTs wnl; A1c 6.3;  (23): Hgb 14.3; Hct 43.8; A1c 6.0; ; ; LDL 64; LDL 64; NonHDL 86; Cre 0.86; K 4.9; LFTs wnl; eGFR 90;  (22): TG 88; ; LDL 40; HDL 46; NonHDL 57; A1c 6.0; K 5.1; Cre 0.98; LFTs wnl; eGFR 80   2022 Cr 1.18 Tri 145 Chol 179 HDL 48   (22):  ; ; LDL 96; HDL 46; A1c 6.2; Cre 1.16; K 5.2; LFTs wnl; eGFR 66  2020 Cr 1.24

## 2023-07-31 NOTE — REVIEW OF SYSTEMS
[Rash] : rash [Itching] : itching [Fever] : no fever [Headache] : no headache [Chills] : no chills [Feeling Fatigued] : not feeling fatigued [SOB] : no shortness of breath [Dyspnea on exertion] : not dyspnea during exertion [Chest Discomfort] : no chest discomfort [Lower Ext Edema] : no extremity edema [Leg Claudication] : no intermittent leg claudication [Palpitations] : no palpitations [Orthopnea] : no orthopnea [PND] : no PND [Syncope] : no syncope [Cough] : no cough [Wheezing] : no wheezing [Coughing Up Blood] : no hemoptysis [Dizziness] : no dizziness [Convulsions] : no convulsions [Limb Weakness (Paresis)] : no limb weakness (Paresis) [Confusion] : no confusion was observed [Depression] : no depression [Anxiety] : no anxiety [Under Stress] : not under stress [Suicidal] : not suicidal [Easy Bleeding] : no tendency for easy bleeding

## 2023-08-03 ENCOUNTER — RX RENEWAL (OUTPATIENT)
Age: 78
End: 2023-08-03

## 2023-08-23 ENCOUNTER — APPOINTMENT (OUTPATIENT)
Dept: INTERNAL MEDICINE | Facility: CLINIC | Age: 78
End: 2023-08-23
Payer: MEDICARE

## 2023-08-23 VITALS
SYSTOLIC BLOOD PRESSURE: 112 MMHG | BODY MASS INDEX: 27.32 KG/M2 | HEART RATE: 64 BPM | OXYGEN SATURATION: 95 % | RESPIRATION RATE: 14 BRPM | HEIGHT: 65 IN | TEMPERATURE: 98.3 F | DIASTOLIC BLOOD PRESSURE: 52 MMHG | WEIGHT: 164 LBS

## 2023-08-23 PROCEDURE — 99214 OFFICE O/P EST MOD 30 MIN: CPT

## 2023-08-23 NOTE — ASSESSMENT
[FreeTextEntry1] : 78 Y OLD MALE WITH PMX OF HTN ,DM ,DYSLIPIDEMIA AND ASHD = DECLINES LABS TILL BACK FROM [facility]  RTO 3 M

## 2023-08-23 NOTE — PHYSICAL EXAM
[No Acute Distress] : no acute distress [Normal Sclera/Conjunctiva] : normal sclera/conjunctiva [Normal Outer Ear/Nose] : the outer ears and nose were normal in appearance [Normal] : no carotid or abdominal bruits heard, no varicosities, pedal pulses are present, no peripheral edema, no extremity clubbing or cyanosis and no palpable aorta [Soft] : abdomen soft [Non Tender] : non-tender [Normal Bowel Sounds] : normal bowel sounds [Coordination Grossly Intact] : coordination grossly intact [Alert and Oriented x3] : oriented to person, place, and time

## 2023-08-24 RX ORDER — L.ACIDOPH/L.BULG/B.BIF/S.THERM 1B-250 MG
TABLET ORAL
Qty: 21 | Refills: 0 | Status: ACTIVE | COMMUNITY
Start: 2023-06-07 | End: 1900-01-01

## 2023-10-09 ENCOUNTER — APPOINTMENT (OUTPATIENT)
Dept: HEART AND VASCULAR | Facility: CLINIC | Age: 78
End: 2023-10-09
Payer: MEDICARE

## 2023-10-09 PROCEDURE — 36415 COLL VENOUS BLD VENIPUNCTURE: CPT

## 2023-10-12 ENCOUNTER — APPOINTMENT (OUTPATIENT)
Dept: HEART AND VASCULAR | Facility: CLINIC | Age: 78
End: 2023-10-12
Payer: MEDICARE

## 2023-10-12 ENCOUNTER — NON-APPOINTMENT (OUTPATIENT)
Age: 78
End: 2023-10-12

## 2023-10-12 VITALS
HEART RATE: 59 BPM | SYSTOLIC BLOOD PRESSURE: 127 MMHG | RESPIRATION RATE: 14 BRPM | OXYGEN SATURATION: 93 % | BODY MASS INDEX: 26.66 KG/M2 | TEMPERATURE: 97.9 F | DIASTOLIC BLOOD PRESSURE: 70 MMHG | WEIGHT: 160 LBS | HEIGHT: 65 IN

## 2023-10-12 PROCEDURE — 99214 OFFICE O/P EST MOD 30 MIN: CPT | Mod: 25

## 2023-10-12 PROCEDURE — 93000 ELECTROCARDIOGRAM COMPLETE: CPT

## 2023-10-20 LAB
ALBUMIN SERPL ELPH-MCNC: 4.2 G/DL
ALP BLD-CCNC: 68 U/L
ALT SERPL-CCNC: 18 U/L
ANION GAP SERPL CALC-SCNC: 12 MMOL/L
AST SERPL-CCNC: 20 U/L
BILIRUB SERPL-MCNC: 0.7 MG/DL
BUN SERPL-MCNC: 23 MG/DL
CALCIUM SERPL-MCNC: 9.9 MG/DL
CHLORIDE SERPL-SCNC: 100 MMOL/L
CHOLEST SERPL-MCNC: 135 MG/DL
CO2 SERPL-SCNC: 28 MMOL/L
CREAT SERPL-MCNC: 0.93 MG/DL
EGFR: 85 ML/MIN/1.73M2
GLUCOSE SERPL-MCNC: 140 MG/DL
HDLC SERPL-MCNC: 52 MG/DL
LDLC SERPL CALC-MCNC: 61 MG/DL
NONHDLC SERPL-MCNC: 83 MG/DL
POTASSIUM SERPL-SCNC: 5 MMOL/L
PROT SERPL-MCNC: 7 G/DL
SODIUM SERPL-SCNC: 140 MMOL/L
TRIGL SERPL-MCNC: 123 MG/DL

## 2023-10-25 ENCOUNTER — APPOINTMENT (OUTPATIENT)
Dept: INTERNAL MEDICINE | Facility: CLINIC | Age: 78
End: 2023-10-25
Payer: MEDICARE

## 2023-10-25 VITALS
TEMPERATURE: 98.5 F | RESPIRATION RATE: 15 BRPM | HEART RATE: 62 BPM | HEIGHT: 65 IN | WEIGHT: 161 LBS | DIASTOLIC BLOOD PRESSURE: 60 MMHG | SYSTOLIC BLOOD PRESSURE: 124 MMHG | OXYGEN SATURATION: 96 % | BODY MASS INDEX: 26.82 KG/M2

## 2023-10-25 DIAGNOSIS — E66.9 TYPE 2 DIABETES MELLITUS WITH OTHER SPECIFIED COMPLICATION: ICD-10-CM

## 2023-10-25 DIAGNOSIS — N40.0 BENIGN PROSTATIC HYPERPLASIA WITHOUT LOWER URINARY TRACT SYMPMS: ICD-10-CM

## 2023-10-25 DIAGNOSIS — E11.69 TYPE 2 DIABETES MELLITUS WITH OTHER SPECIFIED COMPLICATION: ICD-10-CM

## 2023-10-25 PROCEDURE — 99214 OFFICE O/P EST MOD 30 MIN: CPT

## 2023-10-31 LAB
ESTIMATED AVERAGE GLUCOSE: 120 MG/DL
HBA1C MFR BLD HPLC: 5.8 %

## 2023-11-20 ENCOUNTER — RX RENEWAL (OUTPATIENT)
Age: 78
End: 2023-11-20

## 2023-12-06 NOTE — DISCHARGE NOTE NURSING/CASE MANAGEMENT/SOCIAL WORK - NSFLUVACAGEDISCH_IMM_ALL_CORE
Adult Please call the patient and have them schedule an appointment at their earliest convenience to discuss their lab results (virtual okay). Thanks!

## 2024-02-16 ENCOUNTER — RX RENEWAL (OUTPATIENT)
Age: 79
End: 2024-02-16

## 2024-02-26 ENCOUNTER — APPOINTMENT (OUTPATIENT)
Dept: HEART AND VASCULAR | Facility: CLINIC | Age: 79
End: 2024-02-26
Payer: MEDICARE

## 2024-02-26 VITALS
SYSTOLIC BLOOD PRESSURE: 100 MMHG | HEIGHT: 65 IN | WEIGHT: 161 LBS | DIASTOLIC BLOOD PRESSURE: 60 MMHG | HEART RATE: 73 BPM | BODY MASS INDEX: 26.82 KG/M2 | TEMPERATURE: 97 F | OXYGEN SATURATION: 95 % | RESPIRATION RATE: 16 BRPM

## 2024-02-26 PROCEDURE — 99214 OFFICE O/P EST MOD 30 MIN: CPT

## 2024-02-26 RX ORDER — AMLODIPINE BESYLATE 2.5 MG/1
2.5 TABLET ORAL DAILY
Qty: 90 | Refills: 1 | Status: ACTIVE | COMMUNITY
Start: 2023-03-13 | End: 1900-01-01

## 2024-02-26 RX ORDER — CLOPIDOGREL BISULFATE 75 MG/1
75 TABLET, FILM COATED ORAL DAILY
Qty: 90 | Refills: 1 | Status: ACTIVE | COMMUNITY
Start: 2022-03-29 | End: 1900-01-01

## 2024-02-26 RX ORDER — EMPAGLIFLOZIN 10 MG/1
10 TABLET, FILM COATED ORAL DAILY
Qty: 1 | Refills: 1 | Status: ACTIVE | COMMUNITY
Start: 2023-07-31 | End: 1900-01-01

## 2024-02-26 RX ORDER — LOSARTAN POTASSIUM AND HYDROCHLOROTHIAZIDE 25; 100 MG/1; MG/1
100-25 TABLET ORAL
Qty: 90 | Refills: 1 | Status: ACTIVE | COMMUNITY
Start: 2017-03-13 | End: 1900-01-01

## 2024-02-26 RX ORDER — ATORVASTATIN CALCIUM 80 MG/1
80 TABLET, FILM COATED ORAL
Qty: 90 | Refills: 1 | Status: ACTIVE | COMMUNITY
Start: 2022-03-29 | End: 1900-01-01

## 2024-02-26 RX ORDER — METOPROLOL SUCCINATE 50 MG/1
50 TABLET, EXTENDED RELEASE ORAL
Qty: 90 | Refills: 1 | Status: ACTIVE | COMMUNITY
Start: 2017-03-13 | End: 1900-01-01

## 2024-02-26 NOTE — HISTORY OF PRESENT ILLNESS
[FreeTextEntry1] : 79 yo M with HTN DM, HLD, now s/p PCI with ELIO to pLAD on 3/9/2022 and pRCA on 2022 here for follow-up.  The last time I saw him was Oct 2023.  He was recently diagnosed with rectum/liver invasive adenocarcinoma, moderately differentiated. He is currently on chemo and awaits to start RT.   He reports having episodes of bradycardia.  BP today is borderline low.  He has no other cardiac complaints at this time.  He denies changes in his exercise tolerance. He can walk 10 blocks and can climb 2 flights of stairs.  He is complaint with meds   Denies chest pain/shortness of breath/syncope/presyncope/palpitations/LE edema.   PMH  as above  ALL NKDA  Medications  atorvastatin 80 mg daily clopidogrel 75 mg  Toprol  mg daily amlodipine 2.5 mg daily  losartan-hydrochlorothiazide 100 mg-25 mg daily metFORMIN 500 mg twice daily nitroglycerin 0.4 mg sublingual tablet: 1 tab(s) sublingual every 5 minutes, As Needed tamsulosin 0.4 mg Vitamin D3 50 mcg (2000 intl units) dailly  FH father  at age 57 of [place]  SH no smoke, 1 bottle of wine/day, occasional whisky, no drugs  DATA ECG (10/12/23): NSR at 57 bpm w nl axis and no STT abn  ECG (3/13/23): NSR at 55 bpm w nl axis and no STT abnormality  ECG (10/20/22): NSR at 54 bpm with nl axis and no STT abnormality  EKG 2022 SR, WNL, HR 58 BPM EKG 2020 SR, wnl  Cardiac Cath 3/9/2022  Successful PCI of proximal LAD with ELIO x1 (3.5 x 38) after orbital atherectomy LM Distal : There is a 30 % stenosis. Intravascular ultrasound was performed. Imaging shows minimal cross sectional area of 8.9 mm2. Based on the results of this study, the lesion was judged to be non-significant and no intervention was performed. LAD: Ostial 30%, followed by 90% severe stenosis with severe calcification in the prox and the 40% stenosis with systolic knuckling in the mid. First diagonal: Mild diffuse disease. CX  Proximal circumflex: Mild diffuse disease. There is a 30 % stenosis. First obtuse marginal: The segment is large. Mild diffuse disease. There is a 40 % stenosis in the proximal third portion of the segment. Second obtuse marginal: Mild diffuse disease. Third obtuse marginal: Subtotaled, with left to left collaterals.   RCA  Right coronary artery: Prox 80% moderately calcified, distally there is 60% diffuse stenosis and is a small vessel. Right posterior descending artery: Mild diffuse disease.   LVDEP  7 mmHg.  Successful PCI of proximal LAD with ELIO x1 (3.5 x 38) after orbital atherectomy  Cardiac catheterization 2022 Patent proximal LAD stent Excessive PCI of proximal RCA with a 2.5 x 30 and a 2.5 x 12 ELIO postdilated  to 2.75 with NC balloon with excellent angiographic result  CCTA 3/1/2022 -Right dominant coronary arteries with obstructive plaque in the pLAD resulting in up to 50-70% stenosis and pRCA with up to 70-90% stenosis.  -Extensive burden of calcium, with a score of 1125.2 -Normal cardiac cardiac structural morphology -Normal imaged thoracic aorta, pulmonary vasculature, and systemic veins  Echo 2022 LVEF 55-60% Normal left ventricular size and wall thickness, with normal systolic and diastolic function Mild annular calcification Normal trileaflet aortic valve with normal opening.  No evidence of pHTN Technically difficulty study  Echocardiogram 2020 Left ventricular ejection fraction is 60-65% Mild MR  Exercise stress test  The patient exercised for 7 minutes 58 seconds on a Neville protocol (94% of MPHR) EKG with 2 mm ST depression upsloping in V5 and V6 at peak exercise The study was stopped due to fatigue, No chest pain no arrhythmias during exercise or recovery  Labs (10/9/23): A1c 5.8; ; ; HDL 52; LDL 61; NonHDL 83; Cre 0.93; K 5.0; eGFR 85; LFTs wnl;  (23): Cre 0.9; K 5.2; eGFR 89; LFTs wnl; A1c 6.3;  (23): Hgb 14.3; Hct 43.8; A1c 6.0; ; ; LDL 64; LDL 64; NonHDL 86; Cre 0.86; K 4.9; LFTs wnl; eGFR 90;  (22): TG 88; ; LDL 40; HDL 46; NonHDL 57; A1c 6.0; K 5.1; Cre 0.98; LFTs wnl; eGFR 80   2022 Cr 1.18 Tri 145 Chol 179 HDL 48   (22):  ; ; LDL 96; HDL 46; A1c 6.2; Cre 1.16; K 5.2; LFTs wnl; eGFR 66  2020 Cr 1.24

## 2024-02-26 NOTE — ASSESSMENT
[FreeTextEntry1] : 80 yo M with HTN DM, HLD, CAD s/p PCI with ELIO to pLAD on 3/9/2022 and pRCA on 4/22/2022, rectal adenocarcinoma. He is here for follow-up. He is currently on chemo and awaits RT. He has no cardiac complaints at this time. He reports fair exercise capacity. He is noted to have borderline low BP and mild bradycardia. He is otherwise stable from cardiac standpoint.  - I reviewed ECG --> ok. - decrease metoprolol to 50 mg daily  - continue clopidogrel  - continue Atorvastatin 80mg daily - continue amlodipine 2.5 mg daily, for now, but if BP remains borderline low, he has been instructed to stop amlodipine.  - Continue losartan-hctz 100-25 mg daily - continue jardiance 10 mg daily - he will continue to monitor his BP at home and keep a BP log to bring to next visit   Recommended healthy diet/exercise   RTO in 3 mo.

## 2024-02-26 NOTE — REVIEW OF SYSTEMS
[Fever] : no fever [Headache] : no headache [Chills] : no chills [Feeling Fatigued] : not feeling fatigued [SOB] : no shortness of breath [Dyspnea on exertion] : not dyspnea during exertion [Lower Ext Edema] : no extremity edema [Chest Discomfort] : no chest discomfort [Leg Claudication] : no intermittent leg claudication [Palpitations] : no palpitations [Orthopnea] : no orthopnea [PND] : no PND [Syncope] : no syncope [Cough] : no cough [Wheezing] : no wheezing [Coughing Up Blood] : no hemoptysis [Dizziness] : no dizziness [Convulsions] : no convulsions [Limb Weakness (Paresis)] : no limb weakness (Paresis) [Confusion] : no confusion was observed [Depression] : no depression [Anxiety] : no anxiety [Under Stress] : not under stress [Suicidal] : not suicidal [Easy Bleeding] : no tendency for easy bleeding

## 2024-02-26 NOTE — PHYSICAL EXAM
[Well Developed] : well developed [Well Nourished] : well nourished [No Acute Distress] : no acute distress [Normal Venous Pressure] : normal venous pressure [No Carotid Bruit] : no carotid bruit [Normal S1, S2] : normal S1, S2 [No Murmur] : no murmur [No Rub] : no rub [No Gallop] : no gallop [Clear Lung Fields] : clear lung fields [Good Air Entry] : good air entry [No Respiratory Distress] : no respiratory distress  [No Edema] : no edema [No Cyanosis] : no cyanosis [No Clubbing] : no clubbing [No Varicosities] : no varicosities [Rash] : rash [Moves all extremities] : moves all extremities [Normal] : alert and oriented, normal memory [No Focal Deficits] : no focal deficits [Alert and Oriented] : alert and oriented

## 2024-03-10 NOTE — ASSESSMENT
[FreeTextEntry1] : CPE OF 75 Y OLD MALE WITH PMX OF HTN ,DM ,DYSLIPIDEMIA = LABS REVIEWED ,ADVISED TO DECREASE METFORMIN   MG BID FROM TID DUE TO HBA1C OF 6.0 \par RTO IN 3 M \par BPH SX= DISCUSS RX ,PREFERS TO WAIT 
recovery

## 2024-04-03 ENCOUNTER — RX RENEWAL (OUTPATIENT)
Age: 79
End: 2024-04-03

## 2024-04-03 RX ORDER — TAMSULOSIN HYDROCHLORIDE 0.4 MG/1
0.4 CAPSULE ORAL
Qty: 90 | Refills: 1 | Status: ACTIVE | COMMUNITY
Start: 2022-02-07 | End: 1900-01-01

## 2024-04-22 ENCOUNTER — APPOINTMENT (OUTPATIENT)
Dept: INTERNAL MEDICINE | Facility: CLINIC | Age: 79
End: 2024-04-22
Payer: MEDICARE

## 2024-04-22 VITALS
RESPIRATION RATE: 14 BRPM | TEMPERATURE: 98.1 F | WEIGHT: 158 LBS | SYSTOLIC BLOOD PRESSURE: 122 MMHG | BODY MASS INDEX: 26.33 KG/M2 | HEART RATE: 60 BPM | HEIGHT: 65 IN | DIASTOLIC BLOOD PRESSURE: 66 MMHG | OXYGEN SATURATION: 98 %

## 2024-04-22 DIAGNOSIS — I25.10 ATHEROSCLEROTIC HEART DISEASE OF NATIVE CORONARY ARTERY W/OUT ANGINA PECTORIS: ICD-10-CM

## 2024-04-22 DIAGNOSIS — I10 ESSENTIAL (PRIMARY) HYPERTENSION: ICD-10-CM

## 2024-04-22 DIAGNOSIS — Z00.00 ENCOUNTER FOR GENERAL ADULT MEDICAL EXAMINATION W/OUT ABNORMAL FINDINGS: ICD-10-CM

## 2024-04-22 DIAGNOSIS — E78.5 HYPERLIPIDEMIA, UNSPECIFIED: ICD-10-CM

## 2024-04-22 DIAGNOSIS — C20 MALIGNANT NEOPLASM OF RECTUM: ICD-10-CM

## 2024-04-22 PROCEDURE — 99397 PER PM REEVAL EST PAT 65+ YR: CPT

## 2024-04-22 NOTE — HEALTH RISK ASSESSMENT
[Yes] : Yes [Monthly or less (1 pt)] : Monthly or less (1 point) [3 or 4 (1 pt)] : 3 or 4  (1 point) [Never (0 pts)] : Never (0 points) [No] : In the past 12 months have you used drugs other than those required for medical reasons? No [No falls in past year] : Patient reported no falls in the past year [With Significant Other] : lives with significant other [# of Members in Household ___] :  household currently consist of [unfilled] member(s) [Retired] : retired [] :  [# Of Children ___] : has [unfilled] children [Sexually Active] : sexually active [Feels Safe at Home] : Feels safe at home [Former] : Former [EyeExamDate] : 01/24 [ColonoscopyDate] : 10/23 [FreeTextEntry3] : Daughter past away at 38 years old [de-identified] : Quit 30+ years ago

## 2024-04-22 NOTE — PHYSICAL EXAM
[No Acute Distress] : no acute distress [Normal Sclera/Conjunctiva] : normal sclera/conjunctiva [Normal Outer Ear/Nose] : the outer ears and nose were normal in appearance [No Respiratory Distress] : no respiratory distress  [No Accessory Muscle Use] : no accessory muscle use [Normal] : normal rate, regular rhythm, normal S1 and S2 and no murmur heard [No Edema] : there was no peripheral edema [Soft] : abdomen soft [Non Tender] : non-tender [Normal Bowel Sounds] : normal bowel sounds [Normal Anterior Cervical Nodes] : no anterior cervical lymphadenopathy [No CVA Tenderness] : no CVA  tenderness [No Rash] : no rash [Coordination Grossly Intact] : coordination grossly intact [No Focal Deficits] : no focal deficits [Alert and Oriented x3] : oriented to person, place, and time

## 2024-04-22 NOTE — HISTORY OF PRESENT ILLNESS
[de-identified] : NEAR COMPLETION OF CHEMO AND RT ;WILL HAVE CT/MRI IN A FEW WEEKS ;MINIMAL SIDE EFFECTS  CC OF URINARY FREQUENCY FOR OVER 3 WEEKS ,NO OTHER SX  BSFS 120-180

## 2024-04-22 NOTE — ASSESSMENT
[FreeTextEntry1] : CPE OF 79 Y OLD MALE WITH PMX OF DM = HBA1C DYSLIPIDEMIA - HTN - CAD= SEEN BY CARDIO  BPH SX = LABS AND  EVAL ,CONTINUE TAMSULOSIN  RECTAL CA = AS PER GI/ONCOLOGY AT [facility] RTO 3 M

## 2024-04-23 LAB
25(OH)D3 SERPL-MCNC: 43.8 NG/ML
ALBUMIN SERPL ELPH-MCNC: 4.4 G/DL
ALP BLD-CCNC: 63 U/L
ALT SERPL-CCNC: 25 U/L
ANION GAP SERPL CALC-SCNC: 13 MMOL/L
APPEARANCE: CLEAR
AST SERPL-CCNC: 28 U/L
BILIRUB SERPL-MCNC: 1 MG/DL
BILIRUBIN URINE: NEGATIVE
BLOOD URINE: NEGATIVE
BUN SERPL-MCNC: 20 MG/DL
CALCIUM SERPL-MCNC: 9.9 MG/DL
CHLORIDE SERPL-SCNC: 99 MMOL/L
CHOLEST SERPL-MCNC: 131 MG/DL
CO2 SERPL-SCNC: 28 MMOL/L
COLOR: YELLOW
CREAT SERPL-MCNC: 0.79 MG/DL
CREAT SPEC-SCNC: 38 MG/DL
EGFR: 92 ML/MIN/1.73M2
ESTIMATED AVERAGE GLUCOSE: 120 MG/DL
GLUCOSE QUALITATIVE U: >=1000 MG/DL
GLUCOSE SERPL-MCNC: 162 MG/DL
HBA1C MFR BLD HPLC: 5.8 %
HCT VFR BLD CALC: 38 %
HDLC SERPL-MCNC: 49 MG/DL
HGB BLD-MCNC: 13.1 G/DL
KETONES URINE: NEGATIVE MG/DL
LDLC SERPL CALC-MCNC: 51 MG/DL
LEUKOCYTE ESTERASE URINE: NEGATIVE
MCHC RBC-ENTMCNC: 34.5 GM/DL
MCHC RBC-ENTMCNC: 37.3 PG
MCV RBC AUTO: 108.3 FL
MICROALBUMIN 24H UR DL<=1MG/L-MCNC: <1.2 MG/DL
MICROALBUMIN/CREAT 24H UR-RTO: NORMAL MG/G
NITRITE URINE: NEGATIVE
NONHDLC SERPL-MCNC: 82 MG/DL
PH URINE: 6.5
PLATELET # BLD AUTO: 246 K/UL
POTASSIUM SERPL-SCNC: 3.9 MMOL/L
PROT SERPL-MCNC: 6.7 G/DL
PROTEIN URINE: NEGATIVE MG/DL
PSA FREE FLD-MCNC: 30 %
PSA FREE SERPL-MCNC: 0.31 NG/ML
PSA SERPL-MCNC: 1.05 NG/ML
RBC # BLD: 3.51 M/UL
RBC # FLD: 17.7 %
SODIUM SERPL-SCNC: 140 MMOL/L
SPECIFIC GRAVITY URINE: 1.03
TRIGL SERPL-MCNC: 186 MG/DL
TSH SERPL-ACNC: 1.24 UIU/ML
UROBILINOGEN URINE: 0.2 MG/DL
VIT B12 SERPL-MCNC: 407 PG/ML
WBC # FLD AUTO: 4.78 K/UL

## 2024-05-16 ENCOUNTER — RX RENEWAL (OUTPATIENT)
Age: 79
End: 2024-05-16

## 2024-05-16 RX ORDER — METFORMIN HYDROCHLORIDE 500 MG/1
500 TABLET, COATED ORAL
Qty: 180 | Refills: 0 | Status: ACTIVE | COMMUNITY
Start: 2017-06-07 | End: 1900-01-01

## 2024-05-23 ENCOUNTER — APPOINTMENT (OUTPATIENT)
Dept: HEART AND VASCULAR | Facility: CLINIC | Age: 79
End: 2024-05-23

## 2024-05-23 NOTE — PHYSICAL EXAM
[Well Developed] : well developed [Well Nourished] : well nourished [No Acute Distress] : no acute distress [Normal Venous Pressure] : normal venous pressure [No Carotid Bruit] : no carotid bruit [Normal S1, S2] : normal S1, S2 [No Murmur] : no murmur [No Rub] : no rub [No Gallop] : no gallop [Clear Lung Fields] : clear lung fields [Good Air Entry] : good air entry [No Respiratory Distress] : no respiratory distress  [No Edema] : no edema [No Cyanosis] : no cyanosis [No Clubbing] : no clubbing [No Varicosities] : no varicosities [Rash] : rash [Moves all extremities] : moves all extremities [No Focal Deficits] : no focal deficits [Normal] : alert and oriented, normal memory [Alert and Oriented] : alert and oriented

## 2024-06-06 NOTE — ASSESSMENT
[FreeTextEntry1] : 80 yo M with HTN DM, HLD, CAD s/p PCI with ELIO to pLAD on 3/9/2022 and pRCA on 4/22/2022, rectal adenocarcinoma. He is here for follow-up. He is currently on chemo and awaits RT. He has no cardiac complaints at this time. He reports fair exercise capacity. He is noted to have borderline low BP and mild bradycardia. He is otherwise stable from cardiac standpoint.  - I reviewed ECG --> - decrease metoprolol to 50 mg daily  - continue clopidogrel  - continue Atorvastatin 80mg daily - continue amlodipine 2.5 mg daily, for now, but if BP remains borderline low, he has been instructed to stop amlodipine.  - Continue losartan-hctz 100-25 mg daily - continue jardiance 10 mg daily - he will continue to monitor his BP at home and keep a BP log to bring to next visit   Recommended healthy diet/exercise   RTO in 3 mo.

## 2024-06-06 NOTE — HISTORY OF PRESENT ILLNESS
[FreeTextEntry1] : 79 yo M with HTN DM, HLD, now s/p PCI with ELIO to pLAD on 3/9/2022 and pRCA on 2022 here for follow-up.  The last time I saw him was  He was recently diagnosed with rectum/liver invasive adenocarcinoma, moderately differentiated. He is currently on chemo and awaits to start RT.   He reports having episodes of bradycardia.  BP today is borderline low.  He has no other cardiac complaints at this time.  He denies changes in his exercise tolerance. He can walk 10 blocks and can climb 2 flights of stairs.  He is complaint with meds   Denies chest pain/shortness of breath/syncope/presyncope/palpitations/LE edema.   PMH  as above  ALL NKDA  Medications  atorvastatin 80 mg daily clopidogrel 75 mg  Toprol  mg daily amlodipine 2.5 mg daily  losartan-hydrochlorothiazide 100 mg-25 mg daily metFORMIN 500 mg twice daily nitroglycerin 0.4 mg sublingual tablet: 1 tab(s) sublingual every 5 minutes, As Needed tamsulosin 0.4 mg Vitamin D3 50 mcg (2000 intl units) dailly  FH father  at age 57 of MI  SH no smoke, 1 bottle of wine/day, occasional whisky, no drugs  DATA ECG (10/12/23): NSR at 57 bpm w nl axis and no STT abn  ECG (3/13/23): NSR at 55 bpm w nl axis and no STT abnormality  ECG (10/20/22): NSR at 54 bpm with nl axis and no STT abnormality  EKG 2022 SR, WNL, HR 58 BPM EKG 2020 SR, wnl  Cardiac Cath 3/9/2022  Successful PCI of proximal LAD with ELIO x1 (3.5 x 38) after orbital atherectomy LM Distal : There is a 30 % stenosis. Intravascular ultrasound was performed. Imaging shows minimal cross sectional area of 8.9 mm2. Based on the results of this study, the lesion was judged to be non-significant and no intervention was performed. LAD: Ostial 30%, followed by 90% severe stenosis with severe calcification in the prox and the 40% stenosis with systolic knuckling in the mid. First diagonal: Mild diffuse disease. CX  Proximal circumflex: Mild diffuse disease. There is a 30 % stenosis. First obtuse marginal: The segment is large. Mild diffuse disease. There is a 40 % stenosis in the proximal third portion of the segment. Second obtuse marginal: Mild diffuse disease. Third obtuse marginal: Subtotaled, with left to left collaterals.   RCA  Right coronary artery: Prox 80% moderately calcified, distally there is 60% diffuse stenosis and is a small vessel. Right posterior descending artery: Mild diffuse disease.   LVDEP  7 mmHg.  Successful PCI of proximal LAD with ELIO x1 (3.5 x 38) after orbital atherectomy  Cardiac catheterization 2022 Patent proximal LAD stent Excessive PCI of proximal RCA with a 2.5 x 30 and a 2.5 x 12 ELIO postdilated  to 2.75 with NC balloon with excellent angiographic result  CCTA 3/1/2022 -Right dominant coronary arteries with obstructive plaque in the pLAD resulting in up to 50-70% stenosis and pRCA with up to 70-90% stenosis.  -Extensive burden of calcium, with a score of 1125.2 -Normal cardiac cardiac structural morphology -Normal imaged thoracic aorta, pulmonary vasculature, and systemic veins  Echo 2022 LVEF 55-60% Normal left ventricular size and wall thickness, with normal systolic and diastolic function Mild annular calcification Normal trileaflet aortic valve with normal opening.  No evidence of pHTN Technically difficulty study  Echocardiogram 2020 Left ventricular ejection fraction is 60-65% Mild MR  Exercise stress test  The patient exercised for 7 minutes 58 seconds on a Neville protocol (94% of MPHR) EKG with 2 mm ST depression upsloping in V5 and V6 at peak exercise The study was stopped due to fatigue, No chest pain no arrhythmias during exercise or recovery  Labs (24): A1C 5.8, , , HDL 49, LDL 49, LDL 51, NON-HDL 82, K 3.9, CRE 0.79, LFT's wnl, eGFR 92, TSH 1.24, HGB 13.1, HCT 38.0. (10/9/23): A1c 5.8; ; ; HDL 52; LDL 61; NonHDL 83; Cre 0.93; K 5.0; eGFR 85; LFTs wnl;  (23): Cre 0.9; K 5.2; eGFR 89; LFTs wnl; A1c 6.3;  (23): Hgb 14.3; Hct 43.8; A1c 6.0; ; ; LDL 64; LDL 64; NonHDL 86; Cre 0.86; K 4.9; LFTs wnl; eGFR 90;  (22): TG 88; ; LDL 40; HDL 46; NonHDL 57; A1c 6.0; K 5.1; Cre 0.98; LFTs wnl; eGFR 80   2022 Cr 1.18 Tri 145 Chol 179 HDL 48   (22):  ; ; LDL 96; HDL 46; A1c 6.2; Cre 1.16; K 5.2; LFTs wnl; eGFR 66  2020 Cr 1.24

## 2024-07-01 ENCOUNTER — APPOINTMENT (OUTPATIENT)
Dept: FAMILY MEDICINE | Facility: CLINIC | Age: 79
End: 2024-07-01

## 2024-07-22 ENCOUNTER — APPOINTMENT (OUTPATIENT)
Age: 79
End: 2024-07-22

## 2024-07-22 ENCOUNTER — NON-APPOINTMENT (OUTPATIENT)
Age: 79
End: 2024-07-22

## 2024-07-22 VITALS
TEMPERATURE: 97 F | HEIGHT: 65 IN | BODY MASS INDEX: 26.33 KG/M2 | SYSTOLIC BLOOD PRESSURE: 114 MMHG | DIASTOLIC BLOOD PRESSURE: 61 MMHG | HEART RATE: 64 BPM | RESPIRATION RATE: 16 BRPM | WEIGHT: 158 LBS | OXYGEN SATURATION: 97 %

## 2024-07-22 DIAGNOSIS — E78.5 HYPERLIPIDEMIA, UNSPECIFIED: ICD-10-CM

## 2024-07-22 DIAGNOSIS — C20 MALIGNANT NEOPLASM OF RECTUM: ICD-10-CM

## 2024-07-22 DIAGNOSIS — I25.10 ATHEROSCLEROTIC HEART DISEASE OF NATIVE CORONARY ARTERY W/OUT ANGINA PECTORIS: ICD-10-CM

## 2024-07-22 DIAGNOSIS — I10 ESSENTIAL (PRIMARY) HYPERTENSION: ICD-10-CM

## 2024-07-22 PROCEDURE — 93000 ELECTROCARDIOGRAM COMPLETE: CPT

## 2024-07-22 PROCEDURE — 99214 OFFICE O/P EST MOD 30 MIN: CPT | Mod: 25

## 2024-07-22 NOTE — ASSESSMENT
[FreeTextEntry1] : 78 yo M with HTN DM, HLD, CAD s/p PCI with ELIO to pLAD on 3/9/2022 and pRCA on 4/22/2022, rectal adenocarcinoma s/p chemo and RT. He has no cardiac complaints at this time.  - ECG performed and reviewed today --> Normal - continue metoprolol 50 mg, clopidogrel 75 mg, Atorvastatin 80mg, amlodipine 2.5 mg, losartan-hctz 100-25 mg, jardiance 10 mg - he will continue to monitor his BP at home and keep a BP log to bring to next visit  - Increase ambulation as tolerated to aim for approximately 30 minutes of moderate activity 5 days a week.  Heart healthy diet low in sodium, sugars and saturated fats and high in fruits, vegetables and whole grains.  Weight loss.   Patient advised to go to the nearest ED whenever any symptoms persist and/or worsens.  Patient/Family/Caregiver verbalized complete understanding of these instructions.  I spent a total of 25 minutes with more than 50% spent on counseling and coordinating care.     RTO in 3-4 mo.

## 2024-07-22 NOTE — HISTORY OF PRESENT ILLNESS
[FreeTextEntry1] : 78 yo M with HTN DM, HLD, now s/p PCI with ELIO to pLAD on 3/9/2022 and pRCA (22), rectum/liver invasive adenocarcinoma, moderately differentiated s/p chemo and RT (). The last time I saw him was 2024.    He is overall doing well. No cardiac complaints at this time.  He denies changes in his exercise tolerance. He can walk 10 blocks and can climb 2 flights of stairs.  He is complaint with meds   Denies chest pain/shortness of breath/syncope/presyncope/palpitations/LE edema.   PMH  as above  ALL NKDA  Medications  atorvastatin 80 mg daily clopidogrel 75 mg  Toprol XL 50 mg daily amlodipine 2.5 mg daily  losartan-hydrochlorothiazide 100 mg-25 mg daily metFORMIN 500 mg twice daily nitroglycerin 0.4 mg sublingual tablet: 1 tab(s) sublingual every 5 minutes, As Needed tamsulosin 0.4 mg Vitamin D3 50 mcg (2000 intl units) dailly  FH father  at age 57 of MI  SH no smoke, 1 bottle of wine/day, occasional whisky, no drugs  DATA ECG (24): NSR at 60 bpm w nl axis and no STT abn  ECG (10/12/23): NSR at 57 bpm w nl axis and no STT abn  ECG (3/13/23): NSR at 55 bpm w nl axis and no STT abnormality  ECG (10/20/22): NSR at 54 bpm with nl axis and no STT abnormality  EKG 2022 SR, WNL, HR 58 BPM EKG 2020 SR, wnl  Cardiac Cath 3/9/22 : Successful PCI of proximal LAD with ELIO x1 (3.5 x 38) after orbital atherectomy LM Distal : There is a 30 % stenosis. Intravascular ultrasound was performed. Imaging shows minimal cross sectional area of 8.9 mm2. Based on the results of this study, the lesion was judged to be non-significant and no intervention was performed. LAD: Ostial 30%, followed by 90% severe stenosis with severe calcification in the prox and the 40% stenosis with systolic knuckling in the mid. First diagonal: Mild diffuse disease. CX  Proximal circumflex: Mild diffuse disease. There is a 30 % stenosis. First obtuse marginal: The segment is large. Mild diffuse disease. There is a 40 % stenosis in the proximal third portion of the segment. Second obtuse marginal: Mild diffuse disease. Third obtuse marginal: Subtotaled, with left to left collaterals.   RCA  Right coronary artery: Prox 80% moderately calcified, distally there is 60% diffuse stenosis and is a small vessel. Right posterior descending artery: Mild diffuse disease.   LVDEP  7 mmHg.  Successful PCI of proximal LAD with ELIO x1 (3.5 x 38) after orbital atherectomy  Cardiac catheterization 2022 Patent proximal LAD stent Excessive PCI of proximal RCA with a 2.5 x 30 and a 2.5 x 12 ELIO postdilated  to 2.75 with NC balloon with excellent angiographic result  CCTA 3/1/2022 -Right dominant coronary arteries with obstructive plaque in the pLAD resulting in up to 50-70% stenosis and pRCA with up to 70-90% stenosis.  -Extensive burden of calcium, with a score of 1125.2 -Normal cardiac cardiac structural morphology -Normal imaged thoracic aorta, pulmonary vasculature, and systemic veins  Echo 22: LVEF 55-60%. Normal left ventricular size and wall thickness, with normal systolic and diastolic function Mild annular calcification. Normal trileaflet aortic valve with normal opening.  No evidence of pHTN.  Technically difficulty study  Echocardiogram 2020 Left ventricular ejection fraction is 60-65% Mild MR  Exercise stress test 20 The patient exercised for 7 minutes 58 seconds on a Neville protocol (94% of MPHR) EKG with 2 mm ST depression upsloping in V5 and V6 at peak exercise The study was stopped due to fatigue, No chest pain no arrhythmias during exercise or recovery  Labs (24): A1C 5.8, , , HDL 49, LDL 51, NON-HDL 82, K 3.9, CRE 0.79, LFT's wnl, eGFR 92, TSH 1.24, HCT 38.0, HGB 13.1. (10/9/23): A1c 5.8; ; ; HDL 52; LDL 61; NonHDL 83; Cre 0.93; K 5.0; eGFR 85; LFTs wnl;  (23): Cre 0.9; K 5.2; eGFR 89; LFTs wnl; A1c 6.3;  (23): Hgb 14.3; Hct 43.8; A1c 6.0; ; ; LDL 64; LDL 64; NonHDL 86; Cre 0.86; K 4.9; LFTs wnl; eGFR 90;  (22): TG 88; ; LDL 40; HDL 46; NonHDL 57; A1c 6.0; K 5.1; Cre 0.98; LFTs wnl; eGFR 80  22: Cr 1.18; Tri 145; Chol 179; HDL 48;   (22):  ; ; LDL 96; HDL 46; A1c 6.2; Cre 1.16; K 5.2; LFTs wnl; eGFR 66  20: Cr 1.24

## 2024-07-22 NOTE — HISTORY OF PRESENT ILLNESS
[FreeTextEntry1] : 80 yo M with HTN DM, HLD, now s/p PCI with ELIO to pLAD on 3/9/2022 and pRCA (22), rectum/liver invasive adenocarcinoma, moderately differentiated s/p chemo and RT (). The last time I saw him was 2024.    He is overall doing well. No cardiac complaints at this time.  He denies changes in his exercise tolerance. He can walk 10 blocks and can climb 2 flights of stairs.  He is complaint with meds   Denies chest pain/shortness of breath/syncope/presyncope/palpitations/LE edema.   PMH  as above  ALL NKDA  Medications  atorvastatin 80 mg daily clopidogrel 75 mg  Toprol XL 50 mg daily amlodipine 2.5 mg daily  losartan-hydrochlorothiazide 100 mg-25 mg daily metFORMIN 500 mg twice daily nitroglycerin 0.4 mg sublingual tablet: 1 tab(s) sublingual every 5 minutes, As Needed tamsulosin 0.4 mg Vitamin D3 50 mcg (2000 intl units) dailly  FH father  at age 57 of MI  SH no smoke, 1 bottle of wine/day, occasional whisky, no drugs  DATA ECG (24): NSR at 60 bpm w nl axis and no STT abn  ECG (10/12/23): NSR at 57 bpm w nl axis and no STT abn  ECG (3/13/23): NSR at 55 bpm w nl axis and no STT abnormality  ECG (10/20/22): NSR at 54 bpm with nl axis and no STT abnormality  EKG 2022 SR, WNL, HR 58 BPM EKG 2020 SR, wnl  Cardiac Cath 3/9/22 : Successful PCI of proximal LAD with ELIO x1 (3.5 x 38) after orbital atherectomy LM Distal : There is a 30 % stenosis. Intravascular ultrasound was performed. Imaging shows minimal cross sectional area of 8.9 mm2. Based on the results of this study, the lesion was judged to be non-significant and no intervention was performed. LAD: Ostial 30%, followed by 90% severe stenosis with severe calcification in the prox and the 40% stenosis with systolic knuckling in the mid. First diagonal: Mild diffuse disease. CX  Proximal circumflex: Mild diffuse disease. There is a 30 % stenosis. First obtuse marginal: The segment is large. Mild diffuse disease. There is a 40 % stenosis in the proximal third portion of the segment. Second obtuse marginal: Mild diffuse disease. Third obtuse marginal: Subtotaled, with left to left collaterals.   RCA  Right coronary artery: Prox 80% moderately calcified, distally there is 60% diffuse stenosis and is a small vessel. Right posterior descending artery: Mild diffuse disease.   LVDEP  7 mmHg.  Successful PCI of proximal LAD with ELIO x1 (3.5 x 38) after orbital atherectomy  Cardiac catheterization 2022 Patent proximal LAD stent Excessive PCI of proximal RCA with a 2.5 x 30 and a 2.5 x 12 ELIO postdilated  to 2.75 with NC balloon with excellent angiographic result  CCTA 3/1/2022 -Right dominant coronary arteries with obstructive plaque in the pLAD resulting in up to 50-70% stenosis and pRCA with up to 70-90% stenosis.  -Extensive burden of calcium, with a score of 1125.2 -Normal cardiac cardiac structural morphology -Normal imaged thoracic aorta, pulmonary vasculature, and systemic veins  Echo 22: LVEF 55-60%. Normal left ventricular size and wall thickness, with normal systolic and diastolic function Mild annular calcification. Normal trileaflet aortic valve with normal opening.  No evidence of pHTN.  Technically difficulty study  Echocardiogram 2020 Left ventricular ejection fraction is 60-65% Mild MR  Exercise stress test 20 The patient exercised for 7 minutes 58 seconds on a Neville protocol (94% of MPHR) EKG with 2 mm ST depression upsloping in V5 and V6 at peak exercise The study was stopped due to fatigue, No chest pain no arrhythmias during exercise or recovery  Labs (24): A1C 5.8, , , HDL 49, LDL 51, NON-HDL 82, K 3.9, CRE 0.79, LFT's wnl, eGFR 92, TSH 1.24, HCT 38.0, HGB 13.1. (10/9/23): A1c 5.8; ; ; HDL 52; LDL 61; NonHDL 83; Cre 0.93; K 5.0; eGFR 85; LFTs wnl;  (23): Cre 0.9; K 5.2; eGFR 89; LFTs wnl; A1c 6.3;  (23): Hgb 14.3; Hct 43.8; A1c 6.0; ; ; LDL 64; LDL 64; NonHDL 86; Cre 0.86; K 4.9; LFTs wnl; eGFR 90;  (22): TG 88; ; LDL 40; HDL 46; NonHDL 57; A1c 6.0; K 5.1; Cre 0.98; LFTs wnl; eGFR 80  22: Cr 1.18; Tri 145; Chol 179; HDL 48;   (22):  ; ; LDL 96; HDL 46; A1c 6.2; Cre 1.16; K 5.2; LFTs wnl; eGFR 66  20: Cr 1.24

## 2024-07-22 NOTE — ASSESSMENT
[FreeTextEntry1] : 80 yo M with HTN DM, HLD, CAD s/p PCI with ELIO to pLAD on 3/9/2022 and pRCA on 4/22/2022, rectal adenocarcinoma s/p chemo and RT. He has no cardiac complaints at this time.  - ECG performed and reviewed today --> Normal - continue metoprolol 50 mg, clopidogrel 75 mg, Atorvastatin 80mg, amlodipine 2.5 mg, losartan-hctz 100-25 mg, jardiance 10 mg - he will continue to monitor his BP at home and keep a BP log to bring to next visit  - Increase ambulation as tolerated to aim for approximately 30 minutes of moderate activity 5 days a week.  Heart healthy diet low in sodium, sugars and saturated fats and high in fruits, vegetables and whole grains.  Weight loss.   Patient advised to go to the nearest ED whenever any symptoms persist and/or worsens.  Patient/Family/Caregiver verbalized complete understanding of these instructions.  I spent a total of 25 minutes with more than 50% spent on counseling and coordinating care.     RTO in 3-4 mo.

## 2024-08-12 ENCOUNTER — RX RENEWAL (OUTPATIENT)
Age: 79
End: 2024-08-12

## 2024-08-19 ENCOUNTER — APPOINTMENT (OUTPATIENT)
Age: 79
End: 2024-08-19
Payer: MEDICARE

## 2024-08-19 VITALS
DIASTOLIC BLOOD PRESSURE: 64 MMHG | HEART RATE: 67 BPM | RESPIRATION RATE: 14 BRPM | OXYGEN SATURATION: 95 % | TEMPERATURE: 98.1 F | WEIGHT: 155 LBS | BODY MASS INDEX: 25.83 KG/M2 | SYSTOLIC BLOOD PRESSURE: 114 MMHG | HEIGHT: 65 IN

## 2024-08-19 DIAGNOSIS — E11.69 TYPE 2 DIABETES MELLITUS WITH OTHER SPECIFIED COMPLICATION: ICD-10-CM

## 2024-08-19 DIAGNOSIS — N40.0 BENIGN PROSTATIC HYPERPLASIA WITHOUT LOWER URINARY TRACT SYMPMS: ICD-10-CM

## 2024-08-19 DIAGNOSIS — I25.10 ATHEROSCLEROTIC HEART DISEASE OF NATIVE CORONARY ARTERY W/OUT ANGINA PECTORIS: ICD-10-CM

## 2024-08-19 DIAGNOSIS — E78.5 HYPERLIPIDEMIA, UNSPECIFIED: ICD-10-CM

## 2024-08-19 DIAGNOSIS — I10 ESSENTIAL (PRIMARY) HYPERTENSION: ICD-10-CM

## 2024-08-19 PROCEDURE — 99214 OFFICE O/P EST MOD 30 MIN: CPT

## 2024-08-19 NOTE — ASSESSMENT
[FreeTextEntry1] : F/U VISIT OF 79 Y OLD MALE WITH PMX OF HTN = STABLE  ASHD = SEEN BY CARDIO ,CONTINUE PLAVIX,LOSARTAN-HCTZ DYSLIPIDEMIA = LABS AND ATORVASTATIN 80 MG  DM = HBA1C  RECTAL CA = AS PER ONCOLOGY  RTO 3 M

## 2024-08-19 NOTE — PHYSICAL EXAM
[No Acute Distress] : no acute distress [Normal Sclera/Conjunctiva] : normal sclera/conjunctiva [Normal Outer Ear/Nose] : the outer ears and nose were normal in appearance [No JVD] : no jugular venous distention [Normal] : normal rate, regular rhythm, normal S1 and S2 and no murmur heard [No Edema] : there was no peripheral edema [Soft] : abdomen soft [Non Tender] : non-tender [Normal Bowel Sounds] : normal bowel sounds [No CVA Tenderness] : no CVA  tenderness [No Rash] : no rash [Coordination Grossly Intact] : coordination grossly intact [No Focal Deficits] : no focal deficits [Alert and Oriented x3] : oriented to person, place, and time

## 2024-08-20 LAB
ALBUMIN SERPL ELPH-MCNC: 4.7 G/DL
ALP BLD-CCNC: 56 U/L
ALT SERPL-CCNC: 21 U/L
ANION GAP SERPL CALC-SCNC: 14 MMOL/L
AST SERPL-CCNC: 23 U/L
BILIRUB SERPL-MCNC: 0.6 MG/DL
BUN SERPL-MCNC: 23 MG/DL
CALCIUM SERPL-MCNC: 9.7 MG/DL
CHLORIDE SERPL-SCNC: 96 MMOL/L
CO2 SERPL-SCNC: 27 MMOL/L
CREAT SERPL-MCNC: 0.96 MG/DL
EGFR: 81 ML/MIN/1.73M2
ESTIMATED AVERAGE GLUCOSE: 131 MG/DL
GLUCOSE SERPL-MCNC: 100 MG/DL
HBA1C MFR BLD HPLC: 6.2 %
POTASSIUM SERPL-SCNC: 4.3 MMOL/L
PROT SERPL-MCNC: 6.7 G/DL
SODIUM SERPL-SCNC: 137 MMOL/L

## 2024-08-23 LAB
CHOLEST SERPL-MCNC: 145 MG/DL
HDLC SERPL-MCNC: 45 MG/DL
LDLC SERPL CALC-MCNC: 63 MG/DL
NONHDLC SERPL-MCNC: 99 MG/DL
TRIGL SERPL-MCNC: 221 MG/DL

## 2024-10-07 ENCOUNTER — APPOINTMENT (OUTPATIENT)
Age: 79
End: 2024-10-07
Payer: MEDICARE

## 2024-10-07 ENCOUNTER — MED ADMIN CHARGE (OUTPATIENT)
Age: 79
End: 2024-10-07

## 2024-10-07 PROCEDURE — G0008: CPT

## 2024-10-07 PROCEDURE — 90662 IIV NO PRSV INCREASED AG IM: CPT

## 2024-10-27 NOTE — ASSESSMENT
[FreeTextEntry1] : 76 yo M with HTN DM, HLD, CAD s/p PCI with ELIO to pLAD on 3/9/2022 and pRCA on 4/22/2022 . He is here for follow-up. He reports having a pruritic rash. Possibly a dermatitis. Allergy? He has no cardiac complaints at this time. He reports fair exercise capacity.  - I reviewed labs from 5/5/23 --> LDL <70  - I reviewed ECG --> ok.  - stop clopidogrel 75mg - start ASA 81 mg  - continue Atorvastatin 80mg daily - continue Toprol to 100 mg daily - continue amlodipine 2.5 mg daily  - Continue losartan-hctz  100-25 mg daily - stop metformin - start jardiance 10 mg daily  - he will continue to monitor his BP at home and keep a BP log to bring to next visit  - he is scheduled to see another dermatologist for a second opinion    Recommended healthy diet/exercise   RTO in 2 mo   Yes

## 2024-11-08 ENCOUNTER — RX RENEWAL (OUTPATIENT)
Age: 79
End: 2024-11-08

## 2024-11-18 ENCOUNTER — APPOINTMENT (OUTPATIENT)
Age: 79
End: 2024-11-18
Payer: MEDICARE

## 2024-11-18 VITALS
RESPIRATION RATE: 16 BRPM | SYSTOLIC BLOOD PRESSURE: 122 MMHG | WEIGHT: 155 LBS | BODY MASS INDEX: 25.83 KG/M2 | TEMPERATURE: 98 F | HEIGHT: 65 IN | OXYGEN SATURATION: 94 % | HEART RATE: 66 BPM | DIASTOLIC BLOOD PRESSURE: 57 MMHG

## 2024-11-18 DIAGNOSIS — E78.5 HYPERLIPIDEMIA, UNSPECIFIED: ICD-10-CM

## 2024-11-18 PROCEDURE — 99214 OFFICE O/P EST MOD 30 MIN: CPT

## 2024-11-18 PROCEDURE — G2211 COMPLEX E/M VISIT ADD ON: CPT

## 2024-11-27 ENCOUNTER — APPOINTMENT (OUTPATIENT)
Age: 79
End: 2024-11-27
Payer: MEDICARE

## 2024-11-27 VITALS
HEART RATE: 72 BPM | RESPIRATION RATE: 14 BRPM | TEMPERATURE: 98.2 F | OXYGEN SATURATION: 94 % | HEIGHT: 65 IN | SYSTOLIC BLOOD PRESSURE: 134 MMHG | DIASTOLIC BLOOD PRESSURE: 71 MMHG | WEIGHT: 155 LBS | BODY MASS INDEX: 25.83 KG/M2

## 2024-11-27 DIAGNOSIS — E11.69 TYPE 2 DIABETES MELLITUS WITH OTHER SPECIFIED COMPLICATION: ICD-10-CM

## 2024-11-27 DIAGNOSIS — N40.0 BENIGN PROSTATIC HYPERPLASIA WITHOUT LOWER URINARY TRACT SYMPMS: ICD-10-CM

## 2024-11-27 DIAGNOSIS — I10 ESSENTIAL (PRIMARY) HYPERTENSION: ICD-10-CM

## 2024-11-27 DIAGNOSIS — C20 MALIGNANT NEOPLASM OF RECTUM: ICD-10-CM

## 2024-11-27 DIAGNOSIS — I25.10 ATHEROSCLEROTIC HEART DISEASE OF NATIVE CORONARY ARTERY W/OUT ANGINA PECTORIS: ICD-10-CM

## 2024-11-27 PROCEDURE — 99214 OFFICE O/P EST MOD 30 MIN: CPT

## 2024-11-29 LAB
ALBUMIN SERPL ELPH-MCNC: 4.5 G/DL
ALP BLD-CCNC: 58 U/L
ALT SERPL-CCNC: 21 U/L
ANION GAP SERPL CALC-SCNC: 16 MMOL/L
AST SERPL-CCNC: 26 U/L
BILIRUB SERPL-MCNC: 0.7 MG/DL
BUN SERPL-MCNC: 21 MG/DL
CALCIUM SERPL-MCNC: 9.3 MG/DL
CHLORIDE SERPL-SCNC: 98 MMOL/L
CO2 SERPL-SCNC: 25 MMOL/L
CREAT SERPL-MCNC: 0.93 MG/DL
EGFR: 85 ML/MIN/1.73M2
ESTIMATED AVERAGE GLUCOSE: 126 MG/DL
GLUCOSE SERPL-MCNC: 125 MG/DL
HBA1C MFR BLD HPLC: 6 %
POTASSIUM SERPL-SCNC: 4.5 MMOL/L
PROT SERPL-MCNC: 6.9 G/DL
SODIUM SERPL-SCNC: 140 MMOL/L

## 2024-12-06 LAB
CHOLEST SERPL-MCNC: 144 MG/DL
HDLC SERPL-MCNC: 42 MG/DL
LDLC SERPL CALC-MCNC: 58 MG/DL
NONHDLC SERPL-MCNC: 101 MG/DL
TRIGL SERPL-MCNC: 271 MG/DL

## 2025-01-15 ENCOUNTER — APPOINTMENT (OUTPATIENT)
Age: 80
End: 2025-01-15
Payer: MEDICARE

## 2025-01-15 VITALS
SYSTOLIC BLOOD PRESSURE: 151 MMHG | TEMPERATURE: 97.4 F | RESPIRATION RATE: 15 BRPM | OXYGEN SATURATION: 95 % | WEIGHT: 164 LBS | HEIGHT: 65 IN | DIASTOLIC BLOOD PRESSURE: 81 MMHG | BODY MASS INDEX: 27.32 KG/M2 | HEART RATE: 67 BPM

## 2025-01-15 DIAGNOSIS — R07.89 OTHER CHEST PAIN: ICD-10-CM

## 2025-01-15 DIAGNOSIS — J01.90 ACUTE SINUSITIS, UNSPECIFIED: ICD-10-CM

## 2025-01-15 DIAGNOSIS — R05.9 COUGH, UNSPECIFIED: ICD-10-CM

## 2025-01-15 DIAGNOSIS — B96.89 ACUTE SINUSITIS, UNSPECIFIED: ICD-10-CM

## 2025-01-15 PROCEDURE — 99214 OFFICE O/P EST MOD 30 MIN: CPT

## 2025-01-15 RX ORDER — AMOXICILLIN AND CLAVULANATE POTASSIUM 875; 125 MG/1; MG/1
875-125 TABLET, COATED ORAL
Qty: 14 | Refills: 0 | Status: ACTIVE | COMMUNITY
Start: 2025-01-15 | End: 1900-01-01

## 2025-01-15 RX ORDER — PROMETHAZINE HYDROCHLORIDE AND DEXTROMETHORPHAN HYDROBROMIDE ORAL SOLUTION 15; 6.25 MG/5ML; MG/5ML
6.25-15 SOLUTION ORAL
Qty: 1 | Refills: 0 | Status: ACTIVE | COMMUNITY
Start: 2025-01-15 | End: 1900-01-01

## 2025-02-25 ENCOUNTER — APPOINTMENT (OUTPATIENT)
Age: 80
End: 2025-02-25
Payer: MEDICARE

## 2025-02-25 ENCOUNTER — LABORATORY RESULT (OUTPATIENT)
Age: 80
End: 2025-02-25

## 2025-02-25 PROCEDURE — 36415 COLL VENOUS BLD VENIPUNCTURE: CPT

## 2025-02-26 ENCOUNTER — APPOINTMENT (OUTPATIENT)
Age: 80
End: 2025-02-26
Payer: MEDICARE

## 2025-02-26 VITALS
WEIGHT: 165 LBS | OXYGEN SATURATION: 94 % | HEART RATE: 65 BPM | RESPIRATION RATE: 14 BRPM | BODY MASS INDEX: 27.49 KG/M2 | TEMPERATURE: 97.6 F | DIASTOLIC BLOOD PRESSURE: 62 MMHG | HEIGHT: 65 IN | SYSTOLIC BLOOD PRESSURE: 127 MMHG

## 2025-02-26 DIAGNOSIS — E11.69 TYPE 2 DIABETES MELLITUS WITH OTHER SPECIFIED COMPLICATION: ICD-10-CM

## 2025-02-26 DIAGNOSIS — C20 MALIGNANT NEOPLASM OF RECTUM: ICD-10-CM

## 2025-02-26 PROCEDURE — 99214 OFFICE O/P EST MOD 30 MIN: CPT

## 2025-03-24 ENCOUNTER — APPOINTMENT (OUTPATIENT)
Age: 80
End: 2025-03-24
Payer: MEDICARE

## 2025-03-24 ENCOUNTER — NON-APPOINTMENT (OUTPATIENT)
Age: 80
End: 2025-03-24

## 2025-03-24 VITALS
HEART RATE: 62 BPM | OXYGEN SATURATION: 96 % | TEMPERATURE: 98 F | SYSTOLIC BLOOD PRESSURE: 116 MMHG | RESPIRATION RATE: 16 BRPM | WEIGHT: 165 LBS | BODY MASS INDEX: 27.49 KG/M2 | HEIGHT: 65 IN | DIASTOLIC BLOOD PRESSURE: 61 MMHG

## 2025-03-24 DIAGNOSIS — I25.10 ATHEROSCLEROTIC HEART DISEASE OF NATIVE CORONARY ARTERY W/OUT ANGINA PECTORIS: ICD-10-CM

## 2025-03-24 DIAGNOSIS — E78.5 HYPERLIPIDEMIA, UNSPECIFIED: ICD-10-CM

## 2025-03-24 DIAGNOSIS — I10 ESSENTIAL (PRIMARY) HYPERTENSION: ICD-10-CM

## 2025-03-24 DIAGNOSIS — E11.69 TYPE 2 DIABETES MELLITUS WITH OTHER SPECIFIED COMPLICATION: ICD-10-CM

## 2025-03-24 PROCEDURE — 93000 ELECTROCARDIOGRAM COMPLETE: CPT

## 2025-03-24 PROCEDURE — 99214 OFFICE O/P EST MOD 30 MIN: CPT | Mod: 25

## 2025-04-07 ENCOUNTER — APPOINTMENT (OUTPATIENT)
Age: 80
End: 2025-04-07
Payer: MEDICARE

## 2025-04-07 VITALS
BODY MASS INDEX: 27.16 KG/M2 | HEART RATE: 60 BPM | SYSTOLIC BLOOD PRESSURE: 145 MMHG | TEMPERATURE: 98.1 F | OXYGEN SATURATION: 95 % | WEIGHT: 163 LBS | HEIGHT: 65 IN | RESPIRATION RATE: 14 BRPM | DIASTOLIC BLOOD PRESSURE: 75 MMHG

## 2025-04-07 DIAGNOSIS — I10 ESSENTIAL (PRIMARY) HYPERTENSION: ICD-10-CM

## 2025-04-07 DIAGNOSIS — B02.9 ZOSTER W/OUT COMPLICATIONS: ICD-10-CM

## 2025-04-07 DIAGNOSIS — C20 MALIGNANT NEOPLASM OF RECTUM: ICD-10-CM

## 2025-04-07 DIAGNOSIS — E11.69 TYPE 2 DIABETES MELLITUS WITH OTHER SPECIFIED COMPLICATION: ICD-10-CM

## 2025-04-07 PROCEDURE — 99214 OFFICE O/P EST MOD 30 MIN: CPT

## 2025-04-07 RX ORDER — ALBUTEROL SULFATE 90 UG/1
108 (90 BASE) INHALANT RESPIRATORY (INHALATION)
Qty: 1 | Refills: 3 | Status: ACTIVE | COMMUNITY
Start: 2025-04-07 | End: 1900-01-01

## 2025-04-07 RX ORDER — MONTELUKAST 10 MG/1
10 TABLET, FILM COATED ORAL DAILY
Qty: 15 | Refills: 0 | Status: ACTIVE | COMMUNITY
Start: 2025-04-07 | End: 1900-01-01

## 2025-04-17 ENCOUNTER — NON-APPOINTMENT (OUTPATIENT)
Age: 80
End: 2025-04-17

## 2025-04-17 ENCOUNTER — APPOINTMENT (OUTPATIENT)
Dept: INTERNAL MEDICINE | Facility: CLINIC | Age: 80
End: 2025-04-17
Payer: MEDICARE

## 2025-04-17 VITALS
TEMPERATURE: 97.9 F | DIASTOLIC BLOOD PRESSURE: 63 MMHG | BODY MASS INDEX: 27.16 KG/M2 | OXYGEN SATURATION: 96 % | HEIGHT: 65 IN | SYSTOLIC BLOOD PRESSURE: 133 MMHG | RESPIRATION RATE: 17 BRPM | WEIGHT: 163 LBS | HEART RATE: 71 BPM

## 2025-04-17 DIAGNOSIS — R06.09 OTHER FORMS OF DYSPNEA: ICD-10-CM

## 2025-04-17 DIAGNOSIS — R07.89 OTHER CHEST PAIN: ICD-10-CM

## 2025-04-17 DIAGNOSIS — Z20.822 CONTACT WITH AND (SUSPECTED) EXPOSURE TO COVID-19: ICD-10-CM

## 2025-04-17 DIAGNOSIS — R05.9 COUGH, UNSPECIFIED: ICD-10-CM

## 2025-04-17 DIAGNOSIS — R79.89 OTHER SPECIFIED ABNORMAL FINDINGS OF BLOOD CHEMISTRY: ICD-10-CM

## 2025-04-17 DIAGNOSIS — Z01.812 ENCOUNTER FOR PREPROCEDURAL LABORATORY EXAMINATION: ICD-10-CM

## 2025-04-17 DIAGNOSIS — Z87.898 PERSONAL HISTORY OF OTHER SPECIFIED CONDITIONS: ICD-10-CM

## 2025-04-17 DIAGNOSIS — N40.0 BENIGN PROSTATIC HYPERPLASIA WITHOUT LOWER URINARY TRACT SYMPMS: ICD-10-CM

## 2025-04-17 DIAGNOSIS — Z23 ENCOUNTER FOR IMMUNIZATION: ICD-10-CM

## 2025-04-17 DIAGNOSIS — R06.02 SHORTNESS OF BREATH: ICD-10-CM

## 2025-04-17 PROBLEM — Z01.810 PRE-OPERATIVE CARDIOVASCULAR EXAMINATION: Status: RESOLVED | Noted: 2023-03-13 | Resolved: 2025-04-17

## 2025-04-17 PROCEDURE — 99214 OFFICE O/P EST MOD 30 MIN: CPT

## 2025-04-17 RX ORDER — IPRATROPIUM BROMIDE AND ALBUTEROL SULFATE 2.5; .5 MG/3ML; MG/3ML
0.5-2.5 (3) SOLUTION RESPIRATORY (INHALATION)
Qty: 1 | Refills: 0 | Status: ACTIVE | COMMUNITY
Start: 2025-04-17 | End: 1900-01-01

## 2025-04-17 RX ORDER — PREDNISONE 20 MG/1
20 TABLET ORAL
Qty: 5 | Refills: 0 | Status: ACTIVE | COMMUNITY
Start: 2025-04-17 | End: 1900-01-01

## 2025-04-17 RX ORDER — BLOOD-GLUCOSE METER
KIT MISCELLANEOUS
Qty: 1 | Refills: 0 | Status: ACTIVE | COMMUNITY
Start: 2025-04-17 | End: 1900-01-01

## 2025-04-21 ENCOUNTER — APPOINTMENT (OUTPATIENT)
Age: 80
End: 2025-04-21
Payer: MEDICARE

## 2025-04-21 VITALS — SYSTOLIC BLOOD PRESSURE: 101 MMHG | DIASTOLIC BLOOD PRESSURE: 54 MMHG

## 2025-04-21 DIAGNOSIS — I10 ESSENTIAL (PRIMARY) HYPERTENSION: ICD-10-CM

## 2025-04-21 DIAGNOSIS — E78.5 HYPERLIPIDEMIA, UNSPECIFIED: ICD-10-CM

## 2025-04-21 DIAGNOSIS — I25.10 ATHEROSCLEROTIC HEART DISEASE OF NATIVE CORONARY ARTERY W/OUT ANGINA PECTORIS: ICD-10-CM

## 2025-04-21 DIAGNOSIS — Z01.810 ENCOUNTER FOR PREPROCEDURAL CARDIOVASCULAR EXAMINATION: ICD-10-CM

## 2025-04-21 PROCEDURE — G2211 COMPLEX E/M VISIT ADD ON: CPT

## 2025-04-21 PROCEDURE — 99214 OFFICE O/P EST MOD 30 MIN: CPT

## 2025-04-22 ENCOUNTER — APPOINTMENT (OUTPATIENT)
Age: 80
End: 2025-04-22

## 2025-04-22 ENCOUNTER — APPOINTMENT (OUTPATIENT)
Age: 80
End: 2025-04-22
Payer: MEDICARE

## 2025-04-22 VITALS
HEART RATE: 76 BPM | DIASTOLIC BLOOD PRESSURE: 57 MMHG | OXYGEN SATURATION: 95 % | SYSTOLIC BLOOD PRESSURE: 115 MMHG | RESPIRATION RATE: 14 BRPM | TEMPERATURE: 98 F

## 2025-04-22 DIAGNOSIS — J45.909 UNSPECIFIED ASTHMA, UNCOMPLICATED: ICD-10-CM

## 2025-04-22 DIAGNOSIS — R05.3 CHRONIC COUGH: ICD-10-CM

## 2025-04-22 DIAGNOSIS — R09.82 POSTNASAL DRIP: ICD-10-CM

## 2025-04-22 DIAGNOSIS — C22.9 MALIGNANT NEOPLASM OF LIVER, NOT SPECIFIED AS PRIMARY OR SECONDARY: ICD-10-CM

## 2025-04-22 PROCEDURE — 94726 PLETHYSMOGRAPHY LUNG VOLUMES: CPT

## 2025-04-22 PROCEDURE — 94729 DIFFUSING CAPACITY: CPT

## 2025-04-22 PROCEDURE — 94060 EVALUATION OF WHEEZING: CPT

## 2025-04-22 PROCEDURE — 99214 OFFICE O/P EST MOD 30 MIN: CPT

## 2025-04-22 RX ORDER — FLUTICASONE FUROATE 27.5 UG/1
27.5 SPRAY, METERED NASAL DAILY
Qty: 1 | Refills: 2 | Status: ACTIVE | COMMUNITY
Start: 2025-04-22 | End: 1900-01-01

## 2025-05-01 ENCOUNTER — RX RENEWAL (OUTPATIENT)
Age: 80
End: 2025-05-01

## 2025-05-14 ENCOUNTER — RX RENEWAL (OUTPATIENT)
Age: 80
End: 2025-05-14

## 2025-05-20 ENCOUNTER — APPOINTMENT (OUTPATIENT)
Age: 80
End: 2025-05-20
Payer: MEDICARE

## 2025-05-20 VITALS
TEMPERATURE: 98.1 F | RESPIRATION RATE: 14 BRPM | HEART RATE: 70 BPM | SYSTOLIC BLOOD PRESSURE: 133 MMHG | DIASTOLIC BLOOD PRESSURE: 77 MMHG | OXYGEN SATURATION: 97 %

## 2025-05-20 DIAGNOSIS — J45.909 UNSPECIFIED ASTHMA, UNCOMPLICATED: ICD-10-CM

## 2025-05-20 DIAGNOSIS — R05.3 CHRONIC COUGH: ICD-10-CM

## 2025-05-20 DIAGNOSIS — R91.1 SOLITARY PULMONARY NODULE: ICD-10-CM

## 2025-05-20 PROCEDURE — 99214 OFFICE O/P EST MOD 30 MIN: CPT

## 2025-05-21 PROBLEM — R91.1 PULMONARY NODULE: Status: ACTIVE | Noted: 2025-05-21

## 2025-05-21 RX ORDER — FLUTICASONE FUROATE, UMECLIDINIUM BROMIDE AND VILANTEROL TRIFENATATE 100; 62.5; 25 UG/1; UG/1; UG/1
100-62.5-25 POWDER RESPIRATORY (INHALATION) DAILY
Qty: 1 | Refills: 3 | Status: ACTIVE | COMMUNITY
Start: 2025-05-21 | End: 1900-01-01

## 2025-05-23 ENCOUNTER — APPOINTMENT (OUTPATIENT)
Age: 80
End: 2025-05-23
Payer: MEDICARE

## 2025-05-23 PROCEDURE — 36415 COLL VENOUS BLD VENIPUNCTURE: CPT

## 2025-05-28 ENCOUNTER — APPOINTMENT (OUTPATIENT)
Age: 80
End: 2025-05-28
Payer: MEDICARE

## 2025-05-28 ENCOUNTER — NON-APPOINTMENT (OUTPATIENT)
Age: 80
End: 2025-05-28

## 2025-05-28 VITALS
WEIGHT: 158 LBS | RESPIRATION RATE: 14 BRPM | HEIGHT: 65 IN | OXYGEN SATURATION: 94 % | TEMPERATURE: 97 F | SYSTOLIC BLOOD PRESSURE: 144 MMHG | HEART RATE: 68 BPM | BODY MASS INDEX: 26.33 KG/M2 | DIASTOLIC BLOOD PRESSURE: 73 MMHG

## 2025-05-28 DIAGNOSIS — E11.69 TYPE 2 DIABETES MELLITUS WITH OTHER SPECIFIED COMPLICATION: ICD-10-CM

## 2025-05-28 DIAGNOSIS — E78.5 HYPERLIPIDEMIA, UNSPECIFIED: ICD-10-CM

## 2025-05-28 DIAGNOSIS — I25.10 ATHEROSCLEROTIC HEART DISEASE OF NATIVE CORONARY ARTERY W/OUT ANGINA PECTORIS: ICD-10-CM

## 2025-05-28 DIAGNOSIS — R07.0 PAIN IN THROAT: ICD-10-CM

## 2025-05-28 DIAGNOSIS — I10 ESSENTIAL (PRIMARY) HYPERTENSION: ICD-10-CM

## 2025-05-28 DIAGNOSIS — C22.9 MALIGNANT NEOPLASM OF LIVER, NOT SPECIFIED AS PRIMARY OR SECONDARY: ICD-10-CM

## 2025-05-28 PROCEDURE — G0439: CPT

## 2025-05-28 PROCEDURE — 93000 ELECTROCARDIOGRAM COMPLETE: CPT

## 2025-05-29 ENCOUNTER — NON-APPOINTMENT (OUTPATIENT)
Age: 80
End: 2025-05-29

## 2025-07-01 ENCOUNTER — RX RENEWAL (OUTPATIENT)
Age: 80
End: 2025-07-01

## 2025-07-10 ENCOUNTER — APPOINTMENT (OUTPATIENT)
Age: 80
End: 2025-07-10

## 2025-07-12 ENCOUNTER — RX RENEWAL (OUTPATIENT)
Age: 80
End: 2025-07-12

## 2025-07-23 ENCOUNTER — RESULT REVIEW (OUTPATIENT)
Age: 80
End: 2025-07-23

## 2025-07-23 ENCOUNTER — OUTPATIENT (OUTPATIENT)
Dept: OUTPATIENT SERVICES | Facility: HOSPITAL | Age: 80
LOS: 1 days | End: 2025-07-23
Payer: MEDICARE

## 2025-07-23 ENCOUNTER — NON-APPOINTMENT (OUTPATIENT)
Age: 80
End: 2025-07-23

## 2025-07-23 DIAGNOSIS — I25.10 ATHEROSCLEROTIC HEART DISEASE OF NATIVE CORONARY ARTERY WITHOUT ANGINA PECTORIS: ICD-10-CM

## 2025-07-23 PROCEDURE — 93016 CV STRESS TEST SUPVJ ONLY: CPT

## 2025-07-23 PROCEDURE — 93017 CV STRESS TEST TRACING ONLY: CPT

## 2025-07-23 PROCEDURE — 78452 HT MUSCLE IMAGE SPECT MULT: CPT

## 2025-07-23 PROCEDURE — 82962 GLUCOSE BLOOD TEST: CPT

## 2025-07-23 PROCEDURE — 78452 HT MUSCLE IMAGE SPECT MULT: CPT | Mod: 26

## 2025-07-23 PROCEDURE — 93018 CV STRESS TEST I&R ONLY: CPT

## 2025-07-23 PROCEDURE — A9500: CPT

## 2025-07-28 ENCOUNTER — APPOINTMENT (OUTPATIENT)
Age: 80
End: 2025-07-28
Payer: MEDICARE

## 2025-07-28 ENCOUNTER — RX RENEWAL (OUTPATIENT)
Age: 80
End: 2025-07-28

## 2025-07-28 VITALS
TEMPERATURE: 98 F | DIASTOLIC BLOOD PRESSURE: 62 MMHG | SYSTOLIC BLOOD PRESSURE: 109 MMHG | HEART RATE: 76 BPM | BODY MASS INDEX: 26.33 KG/M2 | HEIGHT: 65 IN | RESPIRATION RATE: 16 BRPM | OXYGEN SATURATION: 95 % | WEIGHT: 158 LBS

## 2025-07-28 DIAGNOSIS — I25.10 ATHEROSCLEROTIC HEART DISEASE OF NATIVE CORONARY ARTERY W/OUT ANGINA PECTORIS: ICD-10-CM

## 2025-07-28 DIAGNOSIS — E11.69 TYPE 2 DIABETES MELLITUS WITH OTHER SPECIFIED COMPLICATION: ICD-10-CM

## 2025-07-28 DIAGNOSIS — E78.5 HYPERLIPIDEMIA, UNSPECIFIED: ICD-10-CM

## 2025-07-28 PROCEDURE — G2211 COMPLEX E/M VISIT ADD ON: CPT

## 2025-07-28 PROCEDURE — 99214 OFFICE O/P EST MOD 30 MIN: CPT

## 2025-08-06 ENCOUNTER — APPOINTMENT (OUTPATIENT)
Age: 80
End: 2025-08-06
Payer: MEDICARE

## 2025-08-06 VITALS
BODY MASS INDEX: 26.66 KG/M2 | WEIGHT: 160 LBS | TEMPERATURE: 97.3 F | SYSTOLIC BLOOD PRESSURE: 112 MMHG | HEART RATE: 74 BPM | HEIGHT: 65 IN | RESPIRATION RATE: 14 BRPM | OXYGEN SATURATION: 95 % | DIASTOLIC BLOOD PRESSURE: 57 MMHG

## 2025-08-06 DIAGNOSIS — R05.9 COUGH, UNSPECIFIED: ICD-10-CM

## 2025-08-06 DIAGNOSIS — I10 ESSENTIAL (PRIMARY) HYPERTENSION: ICD-10-CM

## 2025-08-06 DIAGNOSIS — C22.9 MALIGNANT NEOPLASM OF LIVER, NOT SPECIFIED AS PRIMARY OR SECONDARY: ICD-10-CM

## 2025-08-06 DIAGNOSIS — C20 MALIGNANT NEOPLASM OF RECTUM: ICD-10-CM

## 2025-08-06 PROCEDURE — 99214 OFFICE O/P EST MOD 30 MIN: CPT

## 2025-08-25 ENCOUNTER — APPOINTMENT (OUTPATIENT)
Age: 80
End: 2025-08-25

## 2025-08-25 PROCEDURE — 36415 COLL VENOUS BLD VENIPUNCTURE: CPT

## 2025-08-27 ENCOUNTER — APPOINTMENT (OUTPATIENT)
Age: 80
End: 2025-08-27
Payer: MEDICARE

## 2025-08-27 VITALS
BODY MASS INDEX: 26.82 KG/M2 | HEART RATE: 62 BPM | RESPIRATION RATE: 15 BRPM | TEMPERATURE: 97.6 F | HEIGHT: 65 IN | WEIGHT: 161 LBS | SYSTOLIC BLOOD PRESSURE: 122 MMHG | DIASTOLIC BLOOD PRESSURE: 65 MMHG | OXYGEN SATURATION: 96 %

## 2025-08-27 DIAGNOSIS — E11.69 TYPE 2 DIABETES MELLITUS WITH OTHER SPECIFIED COMPLICATION: ICD-10-CM

## 2025-08-27 PROCEDURE — 99214 OFFICE O/P EST MOD 30 MIN: CPT
